# Patient Record
Sex: FEMALE | Race: BLACK OR AFRICAN AMERICAN | NOT HISPANIC OR LATINO | Employment: FULL TIME | URBAN - METROPOLITAN AREA
[De-identification: names, ages, dates, MRNs, and addresses within clinical notes are randomized per-mention and may not be internally consistent; named-entity substitution may affect disease eponyms.]

---

## 2017-02-17 ENCOUNTER — HOSPITAL ENCOUNTER (EMERGENCY)
Facility: HOSPITAL | Age: 32
Discharge: HOME | End: 2017-02-17
Attending: EMERGENCY MEDICINE | Admitting: EMERGENCY MEDICINE

## 2017-02-17 ENCOUNTER — APPOINTMENT (OUTPATIENT)
Dept: GENERAL RADIOLOGY | Facility: HOSPITAL | Age: 32
End: 2017-02-17

## 2017-02-17 VITALS
RESPIRATION RATE: 16 BRPM | BODY MASS INDEX: 43.39 KG/M2 | DIASTOLIC BLOOD PRESSURE: 81 MMHG | SYSTOLIC BLOOD PRESSURE: 126 MMHG | TEMPERATURE: 98.2 F | HEART RATE: 86 BPM | WEIGHT: 270 LBS | HEIGHT: 66 IN | OXYGEN SATURATION: 94 %

## 2017-02-17 DIAGNOSIS — M54.2 MUSCLE PAIN, CERVICAL: ICD-10-CM

## 2017-02-17 DIAGNOSIS — R07.9 CHEST PAIN, UNSPECIFIED TYPE: Primary | ICD-10-CM

## 2017-02-17 DIAGNOSIS — M54.2 NECK PAIN ON RIGHT SIDE: ICD-10-CM

## 2017-02-17 DIAGNOSIS — E87.6 HYPOKALEMIA: ICD-10-CM

## 2017-02-17 LAB
ALBUMIN SERPL-MCNC: 4.1 G/DL (ref 3.2–4.8)
ALBUMIN/GLOB SERPL: 1.5 G/DL (ref 1.5–2.5)
ALP SERPL-CCNC: 49 U/L (ref 25–100)
ALT SERPL W P-5'-P-CCNC: 22 U/L (ref 7–40)
ANION GAP SERPL CALCULATED.3IONS-SCNC: 7 MMOL/L (ref 3–11)
AST SERPL-CCNC: 24 U/L (ref 0–33)
BASOPHILS # BLD AUTO: 0.01 10*3/MM3 (ref 0–0.2)
BASOPHILS NFR BLD AUTO: 0.2 % (ref 0–1)
BILIRUB SERPL-MCNC: 0.2 MG/DL (ref 0.3–1.2)
BUN BLD-MCNC: 9 MG/DL (ref 9–23)
BUN/CREAT SERPL: 12.9 (ref 7–25)
CALCIUM SPEC-SCNC: 9.3 MG/DL (ref 8.7–10.4)
CHLORIDE SERPL-SCNC: 108 MMOL/L (ref 99–109)
CO2 SERPL-SCNC: 25 MMOL/L (ref 20–31)
CREAT BLD-MCNC: 0.7 MG/DL (ref 0.6–1.3)
D DIMER PPP FEU-MCNC: 0.25 MG/L (FEU) (ref 0–0.5)
DEPRECATED RDW RBC AUTO: 43.7 FL (ref 37–54)
EOSINOPHIL # BLD AUTO: 0.14 10*3/MM3 (ref 0.1–0.3)
EOSINOPHIL NFR BLD AUTO: 2.3 % (ref 0–3)
ERYTHROCYTE [DISTWIDTH] IN BLOOD BY AUTOMATED COUNT: 13.2 % (ref 11.3–14.5)
GFR SERPL CREATININE-BSD FRML MDRD: 117 ML/MIN/1.73
GLOBULIN UR ELPH-MCNC: 2.8 GM/DL
GLUCOSE BLD-MCNC: 125 MG/DL (ref 70–100)
HCT VFR BLD AUTO: 42.1 % (ref 34.5–44)
HGB BLD-MCNC: 13.9 G/DL (ref 11.5–15.5)
HOLD SPECIMEN: NORMAL
HOLD SPECIMEN: NORMAL
IMM GRANULOCYTES # BLD: 0.01 10*3/MM3 (ref 0–0.03)
IMM GRANULOCYTES NFR BLD: 0.2 % (ref 0–0.6)
INR PPP: 1.03
LYMPHOCYTES # BLD AUTO: 1.8 10*3/MM3 (ref 0.6–4.8)
LYMPHOCYTES NFR BLD AUTO: 29.2 % (ref 24–44)
MCH RBC QN AUTO: 30 PG (ref 27–31)
MCHC RBC AUTO-ENTMCNC: 33 G/DL (ref 32–36)
MCV RBC AUTO: 90.9 FL (ref 80–99)
MONOCYTES # BLD AUTO: 0.44 10*3/MM3 (ref 0–1)
MONOCYTES NFR BLD AUTO: 7.1 % (ref 0–12)
NEUTROPHILS # BLD AUTO: 3.77 10*3/MM3 (ref 1.5–8.3)
NEUTROPHILS NFR BLD AUTO: 61 % (ref 41–71)
PLATELET # BLD AUTO: 200 10*3/MM3 (ref 150–450)
PMV BLD AUTO: 10.6 FL (ref 6–12)
POTASSIUM BLD-SCNC: 3.4 MMOL/L (ref 3.5–5.5)
PROT SERPL-MCNC: 6.9 G/DL (ref 5.7–8.2)
PROTHROMBIN TIME: 11.2 SECONDS (ref 9.6–11.5)
RBC # BLD AUTO: 4.63 10*6/MM3 (ref 3.89–5.14)
SODIUM BLD-SCNC: 140 MMOL/L (ref 132–146)
TROPONIN I SERPL-MCNC: 0 NG/ML (ref 0–0.07)
WBC NRBC COR # BLD: 6.17 10*3/MM3 (ref 3.5–10.8)
WHOLE BLOOD HOLD SPECIMEN: NORMAL
WHOLE BLOOD HOLD SPECIMEN: NORMAL

## 2017-02-17 PROCEDURE — 85610 PROTHROMBIN TIME: CPT | Performed by: EMERGENCY MEDICINE

## 2017-02-17 PROCEDURE — 99284 EMERGENCY DEPT VISIT MOD MDM: CPT

## 2017-02-17 PROCEDURE — 85379 FIBRIN DEGRADATION QUANT: CPT | Performed by: EMERGENCY MEDICINE

## 2017-02-17 PROCEDURE — 96374 THER/PROPH/DIAG INJ IV PUSH: CPT

## 2017-02-17 PROCEDURE — 85025 COMPLETE CBC W/AUTO DIFF WBC: CPT | Performed by: EMERGENCY MEDICINE

## 2017-02-17 PROCEDURE — 71010 HC CHEST PA OR AP: CPT

## 2017-02-17 PROCEDURE — 93005 ELECTROCARDIOGRAM TRACING: CPT | Performed by: EMERGENCY MEDICINE

## 2017-02-17 PROCEDURE — 80053 COMPREHEN METABOLIC PANEL: CPT | Performed by: EMERGENCY MEDICINE

## 2017-02-17 PROCEDURE — 25010000002 LORAZEPAM PER 2 MG: Performed by: EMERGENCY MEDICINE

## 2017-02-17 PROCEDURE — 84484 ASSAY OF TROPONIN QUANT: CPT

## 2017-02-17 RX ORDER — SODIUM CHLORIDE 0.9 % (FLUSH) 0.9 %
10 SYRINGE (ML) INJECTION AS NEEDED
Status: DISCONTINUED | OUTPATIENT
Start: 2017-02-17 | End: 2017-02-17 | Stop reason: HOSPADM

## 2017-02-17 RX ORDER — ASPIRIN 81 MG/1
324 TABLET, CHEWABLE ORAL ONCE
Status: COMPLETED | OUTPATIENT
Start: 2017-02-17 | End: 2017-02-17

## 2017-02-17 RX ORDER — CYCLOBENZAPRINE HCL 10 MG
10 TABLET ORAL 3 TIMES DAILY PRN
Qty: 6 TABLET | Refills: 0 | OUTPATIENT
Start: 2017-02-17 | End: 2020-01-04 | Stop reason: HOSPADM

## 2017-02-17 RX ORDER — LORAZEPAM 2 MG/ML
0.5 INJECTION INTRAMUSCULAR ONCE
Status: COMPLETED | OUTPATIENT
Start: 2017-02-17 | End: 2017-02-17

## 2017-02-17 RX ADMIN — LORAZEPAM 0.5 MG: 2 INJECTION INTRAMUSCULAR; INTRAVENOUS at 02:12

## 2017-02-17 RX ADMIN — ASPIRIN 81 MG 324 MG: 81 TABLET ORAL at 02:09

## 2017-02-17 NOTE — DISCHARGE INSTRUCTIONS
Follow up at the chest pain clinic as discussed.    Please review the medications you are supposed to be taking according to prior physician recommendations. I have not changed your home medications during this visit. If your discharge instructions indicate that I have changed your home medications, this is not the case, and you should disregard. If you have any questions about the medication you should be taking at home, please call your physician.

## 2017-02-17 NOTE — ED PROVIDER NOTES
Subjective   HPI Comments: Patient is a healthy 31-year-old female who presents today with chest pain starting 4 hours ago.  She states that the pain is distributed throughout the chest and also to the bilateral shoulders with the right hurting more than the left.  The pain is reproducible with movement of the arms.    Patient is a 31 y.o. female presenting with chest pain.   History provided by:  Patient  Chest Pain   Pain location:  Substernal area  Pain quality: dull    Pain quality comment:  Ocasional sharp pain  Radiates to: From right shoulder to the chest.  Also has less significant left shoulder pain.  Pain severity:  Moderate  Onset quality:  Gradual  Duration:  4 hours  Timing:  Constant  Progression:  Waxing and waning  Chronicity:  New  Context: movement, raising an arm, at rest and stress    Context: not breathing, not drug use, not eating, not intercourse, not lifting and not trauma    Relieved by:  Nothing  Worsened by:  Nothing  Ineffective treatments:  None tried  Associated symptoms: anxiety and fatigue    Associated symptoms: no abdominal pain, no altered mental status, no back pain, no cough, no diaphoresis, no dizziness, no fever, no headache, no nausea, no near-syncope, no numbness, no syncope and no vomiting    Risk factors: obesity and smoking    Risk factors: no aortic disease, no birth control, no coronary artery disease, no diabetes mellitus, no Arpita-Danlos syndrome, no high cholesterol, no hypertension, no immobilization, not male, no Marfan's syndrome, not pregnant, no prior DVT/PE and no surgery        Review of Systems   Constitutional: Positive for fatigue. Negative for diaphoresis and fever.   Respiratory: Negative for cough.    Cardiovascular: Positive for chest pain. Negative for syncope and near-syncope.   Gastrointestinal: Negative for abdominal pain, nausea and vomiting.   Genitourinary:        Pt is currently on her period and has expected normal cramping.   Musculoskeletal:  Negative for back pain.   Neurological: Negative for dizziness, numbness and headaches.   All other systems reviewed and are negative.      Past Medical History   Diagnosis Date   • GERD (gastroesophageal reflux disease)        No Known Allergies    Past Surgical History   Procedure Laterality Date   • Breast surgery     • Cholecystectomy     • Gastric sleeve laparoscopic         History reviewed. No pertinent family history.    Social History     Social History   • Marital status:      Spouse name: N/A   • Number of children: N/A   • Years of education: N/A     Social History Main Topics   • Smoking status: Light Tobacco Smoker   • Smokeless tobacco: None   • Alcohol use No   • Drug use: No   • Sexual activity: Not Asked     Other Topics Concern   • None     Social History Narrative           Objective   Physical Exam   Constitutional: She is oriented to person, place, and time. She appears well-developed and well-nourished. No distress.   HENT:   Head: Normocephalic and atraumatic.   Eyes: Conjunctivae and EOM are normal. Pupils are equal, round, and reactive to light.   Neck: Normal range of motion. Neck supple. No thyromegaly present.   Cardiovascular: Normal rate, regular rhythm and normal heart sounds.  Exam reveals no gallop and no friction rub.    No murmur heard.  Pulmonary/Chest: Effort normal and breath sounds normal. No respiratory distress. She exhibits no tenderness.   Abdominal: Soft. There is no tenderness.   Musculoskeletal: Normal range of motion. She exhibits no edema.   Lymphadenopathy:     She has no cervical adenopathy.   Neurological: She is alert and oriented to person, place, and time.   Skin: Skin is warm and dry.   Psychiatric: She has a normal mood and affect.   Nursing note and vitals reviewed.      Procedures         ED Course  ED Course      Recent Results (from the past 24 hour(s))   Comprehensive Metabolic Panel    Collection Time: 02/17/17  2:03 AM   Result Value Ref Range     Glucose 125 (H) 70 - 100 mg/dL    BUN 9 9 - 23 mg/dL    Creatinine 0.70 0.60 - 1.30 mg/dL    Sodium 140 132 - 146 mmol/L    Potassium 3.4 (L) 3.5 - 5.5 mmol/L    Chloride 108 99 - 109 mmol/L    CO2 25.0 20.0 - 31.0 mmol/L    Calcium 9.3 8.7 - 10.4 mg/dL    Total Protein 6.9 5.7 - 8.2 g/dL    Albumin 4.10 3.20 - 4.80 g/dL    ALT (SGPT) 22 7 - 40 U/L    AST (SGOT) 24 0 - 33 U/L    Alkaline Phosphatase 49 25 - 100 U/L    Total Bilirubin 0.2 (L) 0.3 - 1.2 mg/dL    eGFR  African Amer 117 >60 mL/min/1.73    Globulin 2.8 gm/dL    A/G Ratio 1.5 1.5 - 2.5 g/dL    BUN/Creatinine Ratio 12.9 7.0 - 25.0    Anion Gap 7.0 3.0 - 11.0 mmol/L   Protime-INR    Collection Time: 02/17/17  2:03 AM   Result Value Ref Range    Protime 11.2 9.6 - 11.5 Seconds    INR 1.03    CBC Auto Differential    Collection Time: 02/17/17  2:03 AM   Result Value Ref Range    WBC 6.17 3.50 - 10.80 10*3/mm3    RBC 4.63 3.89 - 5.14 10*6/mm3    Hemoglobin 13.9 11.5 - 15.5 g/dL    Hematocrit 42.1 34.5 - 44.0 %    MCV 90.9 80.0 - 99.0 fL    MCH 30.0 27.0 - 31.0 pg    MCHC 33.0 32.0 - 36.0 g/dL    RDW 13.2 11.3 - 14.5 %    RDW-SD 43.7 37.0 - 54.0 fl    MPV 10.6 6.0 - 12.0 fL    Platelets 200 150 - 450 10*3/mm3    Neutrophil % 61.0 41.0 - 71.0 %    Lymphocyte % 29.2 24.0 - 44.0 %    Monocyte % 7.1 0.0 - 12.0 %    Eosinophil % 2.3 0.0 - 3.0 %    Basophil % 0.2 0.0 - 1.0 %    Immature Grans % 0.2 0.0 - 0.6 %    Neutrophils, Absolute 3.77 1.50 - 8.30 10*3/mm3    Lymphocytes, Absolute 1.80 0.60 - 4.80 10*3/mm3    Monocytes, Absolute 0.44 0.00 - 1.00 10*3/mm3    Eosinophils, Absolute 0.14 0.10 - 0.30 10*3/mm3    Basophils, Absolute 0.01 0.00 - 0.20 10*3/mm3    Immature Grans, Absolute 0.01 0.00 - 0.03 10*3/mm3   D-dimer, Quantitative    Collection Time: 02/17/17  2:03 AM   Result Value Ref Range    D-Dimer, Quantitative 0.25 0.00 - 0.50 mg/L (FEU)   POC Troponin, Rapid    Collection Time: 02/17/17  2:05 AM   Result Value Ref Range    Troponin I 0.00 0.00 - 0.07 ng/mL  "    Note: In addition to lab results from this visit, the labs listed above may include labs taken at another facility or during a different encounter within the last 24 hours. Please correlate lab times with ED admission and discharge times for further clarification of the services performed during this visit.    XR Chest 1 View    (Results Pending)     Vitals:    02/17/17 0120 02/17/17 0213 02/17/17 0259 02/17/17 0406   BP: 144/89 145/90 141/91 126/81   Pulse: 96 84 82 86   Resp: 14   16   Temp: 98.2 °F (36.8 °C)      SpO2: 95% 99% 92% 94%   Weight: 270 lb (122 kg)      Height: 66\" (167.6 cm)        Medications   sodium chloride 0.9 % flush 10 mL (not administered)   aspirin chewable tablet 324 mg (324 mg Oral Given 2/17/17 0209)   LORazepam (ATIVAN) injection 0.5 mg (0.5 mg Intravenous Given 2/17/17 0212)     ECG/EMG Results (last 24 hours)     Procedure Component Value Units Date/Time    ECG 12 Lead [[id number]] Collected:  02/17/17 0127     Updated:  02/17/17 0310    Narrative:       Test Reason : cp  Blood Pressure : **/** mmHG  Vent. Rate : 089 BPM     Atrial Rate : 089 BPM     P-R Int : 174 ms          QRS Dur : 108 ms      QT Int : 392 ms       P-R-T Axes : 062 -06 003 degrees     QTc Int : 476 ms    Normal sinus rhythm  When compared with ECG of 19-FEB-2010 02:33,  fusion complexes are no longer present  Confirmed by NATALIIA RICHARDSON ([id number]) on 2/17/2017 3:09:27 AM  Also confirmed by MD RM CORY ([id number])  on 2/17/2017 3:10:01 AM    Referred By: STAT ERMD           Confirmed By:SKYLA RM MD        Patient felt significantly better primarily following the administration of Ativan.  Discussed multiple etiologies for chest pain along with patient's imaging and lab studies.  She will follow the chest pain clinic later today and will return to the emergency department should symptoms return or other concerns arise.            MDM    Final diagnoses:   Chest pain, unspecified type   Neck pain on right side "   Muscle pain, cervical   Hypokalemia            Ravin Ochoa,   02/17/17 9893

## 2017-04-04 ENCOUNTER — APPOINTMENT (OUTPATIENT)
Dept: CT IMAGING | Facility: HOSPITAL | Age: 32
End: 2017-04-04

## 2017-04-04 ENCOUNTER — HOSPITAL ENCOUNTER (EMERGENCY)
Facility: HOSPITAL | Age: 32
Discharge: HOME | End: 2017-04-04
Attending: EMERGENCY MEDICINE | Admitting: EMERGENCY MEDICINE

## 2017-04-04 VITALS
BODY MASS INDEX: 40.81 KG/M2 | DIASTOLIC BLOOD PRESSURE: 62 MMHG | HEIGHT: 67 IN | RESPIRATION RATE: 14 BRPM | TEMPERATURE: 98.2 F | SYSTOLIC BLOOD PRESSURE: 111 MMHG | HEART RATE: 64 BPM | WEIGHT: 260 LBS | OXYGEN SATURATION: 100 %

## 2017-04-04 DIAGNOSIS — R10.9 ABDOMINAL PAIN, UNSPECIFIED LOCATION: Primary | ICD-10-CM

## 2017-04-04 DIAGNOSIS — R11.10 VOMITING AND DIARRHEA: ICD-10-CM

## 2017-04-04 DIAGNOSIS — R19.7 VOMITING AND DIARRHEA: ICD-10-CM

## 2017-04-04 LAB
ALBUMIN SERPL-MCNC: 4.2 G/DL (ref 3.2–4.8)
ALBUMIN/GLOB SERPL: 1.4 G/DL (ref 1.5–2.5)
ALP SERPL-CCNC: 57 U/L (ref 25–100)
ALT SERPL W P-5'-P-CCNC: 20 U/L (ref 7–40)
ANION GAP SERPL CALCULATED.3IONS-SCNC: 8 MMOL/L (ref 3–11)
AST SERPL-CCNC: 22 U/L (ref 0–33)
B-HCG UR QL: NEGATIVE
BACTERIA UR QL AUTO: ABNORMAL /HPF
BASOPHILS # BLD AUTO: 0 10*3/MM3 (ref 0–0.2)
BASOPHILS NFR BLD AUTO: 0 % (ref 0–1)
BILIRUB SERPL-MCNC: 0.4 MG/DL (ref 0.3–1.2)
BILIRUB UR QL STRIP: NEGATIVE
BUN BLD-MCNC: 7 MG/DL (ref 9–23)
BUN/CREAT SERPL: 10 (ref 7–25)
CALCIUM SPEC-SCNC: 9.2 MG/DL (ref 8.7–10.4)
CHLORIDE SERPL-SCNC: 105 MMOL/L (ref 99–109)
CLARITY UR: ABNORMAL
CO2 SERPL-SCNC: 26 MMOL/L (ref 20–31)
COLOR UR: YELLOW
CREAT BLD-MCNC: 0.7 MG/DL (ref 0.6–1.3)
DEPRECATED RDW RBC AUTO: 43.4 FL (ref 37–54)
EOSINOPHIL # BLD AUTO: 0.09 10*3/MM3 (ref 0.1–0.3)
EOSINOPHIL NFR BLD AUTO: 1.5 % (ref 0–3)
ERYTHROCYTE [DISTWIDTH] IN BLOOD BY AUTOMATED COUNT: 13.2 % (ref 11.3–14.5)
GFR SERPL CREATININE-BSD FRML MDRD: 116 ML/MIN/1.73
GLOBULIN UR ELPH-MCNC: 3 GM/DL
GLUCOSE BLD-MCNC: 108 MG/DL (ref 70–100)
GLUCOSE UR STRIP-MCNC: NEGATIVE MG/DL
HCT VFR BLD AUTO: 43.3 % (ref 34.5–44)
HGB BLD-MCNC: 14.7 G/DL (ref 11.5–15.5)
HGB UR QL STRIP.AUTO: NEGATIVE
HOLD SPECIMEN: NORMAL
HYALINE CASTS UR QL AUTO: ABNORMAL /LPF
IMM GRANULOCYTES # BLD: 0.02 10*3/MM3 (ref 0–0.03)
IMM GRANULOCYTES NFR BLD: 0.3 % (ref 0–0.6)
INTERNAL NEGATIVE CONTROL: NORMAL
INTERNAL POSITIVE CONTROL: NORMAL
KETONES UR QL STRIP: NEGATIVE
LEUKOCYTE ESTERASE UR QL STRIP.AUTO: NEGATIVE
LIPASE SERPL-CCNC: 37 U/L (ref 6–51)
LYMPHOCYTES # BLD AUTO: 0.92 10*3/MM3 (ref 0.6–4.8)
LYMPHOCYTES NFR BLD AUTO: 15.6 % (ref 24–44)
Lab: NORMAL
MCH RBC QN AUTO: 30.8 PG (ref 27–31)
MCHC RBC AUTO-ENTMCNC: 33.9 G/DL (ref 32–36)
MCV RBC AUTO: 90.8 FL (ref 80–99)
MONOCYTES # BLD AUTO: 0.57 10*3/MM3 (ref 0–1)
MONOCYTES NFR BLD AUTO: 9.6 % (ref 0–12)
NEUTROPHILS # BLD AUTO: 4.31 10*3/MM3 (ref 1.5–8.3)
NEUTROPHILS NFR BLD AUTO: 73 % (ref 41–71)
NITRITE UR QL STRIP: NEGATIVE
PH UR STRIP.AUTO: 6.5 [PH] (ref 5–8)
PLATELET # BLD AUTO: 195 10*3/MM3 (ref 150–450)
PMV BLD AUTO: 10.6 FL (ref 6–12)
POTASSIUM BLD-SCNC: 3.4 MMOL/L (ref 3.5–5.5)
PROT SERPL-MCNC: 7.2 G/DL (ref 5.7–8.2)
PROT UR QL STRIP: NEGATIVE
RBC # BLD AUTO: 4.77 10*6/MM3 (ref 3.89–5.14)
RBC # UR: ABNORMAL /HPF
REF LAB TEST METHOD: ABNORMAL
SODIUM BLD-SCNC: 139 MMOL/L (ref 132–146)
SP GR UR STRIP: 1.02 (ref 1–1.03)
SQUAMOUS #/AREA URNS HPF: ABNORMAL /HPF
UROBILINOGEN UR QL STRIP: ABNORMAL
WBC NRBC COR # BLD: 5.91 10*3/MM3 (ref 3.5–10.8)
WBC UR QL AUTO: ABNORMAL /HPF
WHOLE BLOOD HOLD SPECIMEN: NORMAL

## 2017-04-04 PROCEDURE — 85025 COMPLETE CBC W/AUTO DIFF WBC: CPT | Performed by: EMERGENCY MEDICINE

## 2017-04-04 PROCEDURE — 99284 EMERGENCY DEPT VISIT MOD MDM: CPT

## 2017-04-04 PROCEDURE — 96375 TX/PRO/DX INJ NEW DRUG ADDON: CPT

## 2017-04-04 PROCEDURE — 25010000002 HYDROMORPHONE PER 4 MG: Performed by: EMERGENCY MEDICINE

## 2017-04-04 PROCEDURE — 25010000002 ONDANSETRON PER 1 MG: Performed by: EMERGENCY MEDICINE

## 2017-04-04 PROCEDURE — 0 DIATRIZOATE MEGLUMINE & SODIUM PER 1 ML: Performed by: EMERGENCY MEDICINE

## 2017-04-04 PROCEDURE — 96361 HYDRATE IV INFUSION ADD-ON: CPT

## 2017-04-04 PROCEDURE — 96374 THER/PROPH/DIAG INJ IV PUSH: CPT

## 2017-04-04 PROCEDURE — 83690 ASSAY OF LIPASE: CPT | Performed by: EMERGENCY MEDICINE

## 2017-04-04 PROCEDURE — 0 IOPAMIDOL 61 % SOLUTION: Performed by: EMERGENCY MEDICINE

## 2017-04-04 PROCEDURE — 74177 CT ABD & PELVIS W/CONTRAST: CPT

## 2017-04-04 PROCEDURE — 25010000002 PROMETHAZINE PER 50 MG: Performed by: NURSE PRACTITIONER

## 2017-04-04 PROCEDURE — 96376 TX/PRO/DX INJ SAME DRUG ADON: CPT

## 2017-04-04 PROCEDURE — 81001 URINALYSIS AUTO W/SCOPE: CPT | Performed by: EMERGENCY MEDICINE

## 2017-04-04 PROCEDURE — 80053 COMPREHEN METABOLIC PANEL: CPT | Performed by: EMERGENCY MEDICINE

## 2017-04-04 RX ORDER — PROMETHAZINE HYDROCHLORIDE 25 MG/ML
12.5 INJECTION, SOLUTION INTRAMUSCULAR; INTRAVENOUS ONCE
Status: COMPLETED | OUTPATIENT
Start: 2017-04-04 | End: 2017-04-04

## 2017-04-04 RX ORDER — HYDROMORPHONE HYDROCHLORIDE 1 MG/ML
0.5 INJECTION, SOLUTION INTRAMUSCULAR; INTRAVENOUS; SUBCUTANEOUS ONCE
Status: COMPLETED | OUTPATIENT
Start: 2017-04-04 | End: 2017-04-04

## 2017-04-04 RX ORDER — HYDROCODONE BITARTRATE AND ACETAMINOPHEN 5; 325 MG/1; MG/1
1-2 TABLET ORAL EVERY 6 HOURS PRN
Qty: 12 TABLET | Refills: 0 | OUTPATIENT
Start: 2017-04-04 | End: 2020-01-04 | Stop reason: HOSPADM

## 2017-04-04 RX ORDER — SODIUM CHLORIDE 0.9 % (FLUSH) 0.9 %
10 SYRINGE (ML) INJECTION AS NEEDED
Status: DISCONTINUED | OUTPATIENT
Start: 2017-04-04 | End: 2017-04-04 | Stop reason: HOSPADM

## 2017-04-04 RX ORDER — ONDANSETRON 2 MG/ML
4 INJECTION INTRAMUSCULAR; INTRAVENOUS ONCE
Status: COMPLETED | OUTPATIENT
Start: 2017-04-04 | End: 2017-04-04

## 2017-04-04 RX ORDER — DICYCLOMINE HCL 20 MG
20 TABLET ORAL EVERY 6 HOURS
Qty: 20 TABLET | Refills: 0 | OUTPATIENT
Start: 2017-04-04 | End: 2020-01-04 | Stop reason: HOSPADM

## 2017-04-04 RX ORDER — PROMETHAZINE HYDROCHLORIDE 25 MG/1
25 TABLET ORAL EVERY 6 HOURS PRN
Qty: 12 TABLET | Refills: 0 | OUTPATIENT
Start: 2017-04-04 | End: 2020-01-04 | Stop reason: HOSPADM

## 2017-04-04 RX ADMIN — IOPAMIDOL 95 ML: 612 INJECTION, SOLUTION INTRAVENOUS at 16:08

## 2017-04-04 RX ADMIN — HYDROMORPHONE HYDROCHLORIDE 0.5 MG: 1 INJECTION, SOLUTION INTRAMUSCULAR; INTRAVENOUS; SUBCUTANEOUS at 14:15

## 2017-04-04 RX ADMIN — HYDROMORPHONE HYDROCHLORIDE 0.5 MG: 1 INJECTION, SOLUTION INTRAMUSCULAR; INTRAVENOUS; SUBCUTANEOUS at 16:35

## 2017-04-04 RX ADMIN — DIATRIZOATE MEGLUMINE AND DIATRIZOATE SODIUM 15 ML: 660; 100 LIQUID ORAL; RECTAL at 14:20

## 2017-04-04 RX ADMIN — PROMETHAZINE HYDROCHLORIDE 12.5 MG: 25 INJECTION, SOLUTION INTRAMUSCULAR; INTRAVENOUS at 17:22

## 2017-04-04 RX ADMIN — SODIUM CHLORIDE 1000 ML: 9 INJECTION, SOLUTION INTRAVENOUS at 14:14

## 2017-04-04 RX ADMIN — ONDANSETRON 4 MG: 2 INJECTION INTRAMUSCULAR; INTRAVENOUS at 16:36

## 2017-04-04 RX ADMIN — ONDANSETRON 4 MG: 2 INJECTION INTRAMUSCULAR; INTRAVENOUS at 14:15

## 2017-04-04 NOTE — DISCHARGE INSTRUCTIONS
Follow up with one of the [facility] physician groups below to setup primary care. If you have trouble following up, please call Gracie Hess, our transitional care nurse, at [phone].    (Dr. Foy, Dr. Walker, Dr. Beckwith, and Dr. Allison.)  [facility], Primary Care, [phone], [address], [place] [address]    [facility], Primary Care, [phone], [address] [place], [address] [facility]) [facility] Medical [facility], Primary Care, [phone], [address] [place], [address] [facility], Primary Care, [phone], [address], [address] [place], [address]     [place] [address] [facility] Medical [facility], Primary Care, [phone], [address], [address] [place], [address]    [place] [address] [facility], Primary Care, [phone], [address]    [place], [address] [facility], Primary Care, [phone], [address] [place], [address] [facility] Medical [facility], Primary Care, [phone], [address], [address] [place], [address] [facility]) [facility] Medical [facility], Primary Care, [phone], [address] [place], [address] [facility] Medical [facility], Primary Care, [phone], [address], [address] [place], [address] [facility], Primary Care, [phone], [address], [place], [address]     [facility], Primary Care, [phone], [address] [address]    Follow up with one of the physician centers below to setup primary care.    [facility], [phone],  [address], [address], [place], [facility]    [facility], [phone], [address] [facility] [address], [address] [place] [address], [phone], [address] [place], [facility];     [facility], [phone], [address] [place] [address]

## 2017-04-04 NOTE — ED NOTES
Pt complained of extreme pain upon insertion of butterfly needle (with flash) and insisted that she would wait for the IV for blood draw.     Arsalan Chu  04/04/17 4512

## 2017-04-04 NOTE — ED PROVIDER NOTES
Subjective   Patient is a 32 y.o. female presenting with abdominal pain.   Abdominal Pain   Pain location:  Generalized  Pain quality: aching and cramping    Pain radiates to:  Does not radiate  Onset quality:  Gradual  Duration:  2 days  Timing:  Constant  Progression:  Waxing and waning  Chronicity:  New  Context: eating    Relieved by:  Nothing  Worsened by:  Eating  Associated symptoms: diarrhea, nausea and vomiting    Associated symptoms: no fever        Review of Systems   Constitutional: Negative for fever.   Gastrointestinal: Positive for abdominal pain, diarrhea, nausea and vomiting.   All other systems reviewed and are negative.      Past Medical History:   Diagnosis Date   • GERD (gastroesophageal reflux disease)        No Known Allergies    Past Surgical History:   Procedure Laterality Date   • BREAST SURGERY     • CHOLECYSTECTOMY     • GASTRIC SLEEVE LAPAROSCOPIC         History reviewed. No pertinent family history.    Social History     Social History   • Marital status:      Spouse name: N/A   • Number of children: N/A   • Years of education: N/A     Social History Main Topics   • Smoking status: Light Tobacco Smoker   • Smokeless tobacco: None   • Alcohol use No   • Drug use: No   • Sexual activity: Not Asked     Other Topics Concern   • None     Social History Narrative           Objective   Physical Exam   Constitutional: She is oriented to person, place, and time. She appears well-developed and well-nourished.   HENT:   Head: Normocephalic and atraumatic.   Right Ear: External ear normal.   Left Ear: External ear normal.   Nose: Nose normal.   Mouth/Throat: Oropharynx is clear and moist.   Eyes: Conjunctivae and EOM are normal. Pupils are equal, round, and reactive to light.   Neck: Normal range of motion. Neck supple.   Cardiovascular: Normal rate, regular rhythm, normal heart sounds and intact distal pulses.    Pulmonary/Chest: Effort normal and breath sounds normal.   Abdominal: Soft.  Bowel sounds are normal. There is tenderness.   Musculoskeletal: Normal range of motion.   Neurological: She is alert and oriented to person, place, and time.   Skin: Skin is warm and dry.   Psychiatric: She has a normal mood and affect. Her behavior is normal. Judgment and thought content normal.       Procedures         ED Course  ED Course   Comment By Time   Well aware of the ss of worsening condition. All thankful and agreeable. Pcp fu this week. TUYET Garrison 04/04 1641   Seen by bariatric surgeon in [facility]. Told her sleeve was ok. Will give colorectal fu as she reports they wanted her to see them next. TUYET Garrison 04/04 1704                  [facility]    Final diagnoses:   Abdominal pain, unspecified location   Vomiting and diarrhea            TUYET Garrison  04/04/17 1652       TUYET Garrison  04/04/17 1705       TUYET Garrison  04/04/17 1822

## 2018-03-14 ENCOUNTER — APPOINTMENT (OUTPATIENT)
Dept: GENERAL RADIOLOGY | Facility: HOSPITAL | Age: 33
End: 2018-03-14

## 2018-03-14 ENCOUNTER — HOSPITAL ENCOUNTER (EMERGENCY)
Facility: HOSPITAL | Age: 33
Discharge: HOME | End: 2018-03-14
Attending: EMERGENCY MEDICINE | Admitting: EMERGENCY MEDICINE

## 2018-03-14 VITALS
BODY MASS INDEX: 42.38 KG/M2 | RESPIRATION RATE: 16 BRPM | HEART RATE: 62 BPM | TEMPERATURE: 98.2 F | SYSTOLIC BLOOD PRESSURE: 140 MMHG | HEIGHT: 67 IN | OXYGEN SATURATION: 98 % | DIASTOLIC BLOOD PRESSURE: 80 MMHG | WEIGHT: 270 LBS

## 2018-03-14 DIAGNOSIS — S16.1XXA STRAIN OF NECK MUSCLE, INITIAL ENCOUNTER: ICD-10-CM

## 2018-03-14 DIAGNOSIS — S39.012A STRAIN OF LUMBAR REGION, INITIAL ENCOUNTER: ICD-10-CM

## 2018-03-14 DIAGNOSIS — S70.02XA CONTUSION OF LEFT HIP, INITIAL ENCOUNTER: ICD-10-CM
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
## 2018-03-14 DIAGNOSIS — S40.012A CONTUSION OF LEFT SHOULDER, INITIAL ENCOUNTER: ICD-10-CM

## 2018-03-14 DIAGNOSIS — S09.90XA INJURY OF HEAD, INITIAL ENCOUNTER: ICD-10-CM

## 2018-03-14 LAB
B-HCG UR QL: NEGATIVE
INTERNAL NEGATIVE CONTROL: NEGATIVE
INTERNAL POSITIVE CONTROL: POSITIVE
Lab: NORMAL

## 2018-03-14 PROCEDURE — 73030 X-RAY EXAM OF SHOULDER: CPT

## 2018-03-14 PROCEDURE — 73502 X-RAY EXAM HIP UNI 2-3 VIEWS: CPT

## 2018-03-14 PROCEDURE — 99283 EMERGENCY DEPT VISIT LOW MDM: CPT

## 2018-03-14 PROCEDURE — 72100 X-RAY EXAM L-S SPINE 2/3 VWS: CPT

## 2018-03-14 PROCEDURE — 72040 X-RAY EXAM NECK SPINE 2-3 VW: CPT

## 2018-03-14 RX ORDER — ACETAMINOPHEN 500 MG
1000 TABLET ORAL ONCE
Status: COMPLETED | OUTPATIENT
Start: 2018-03-14 | End: 2018-03-14

## 2018-03-14 RX ADMIN — ACETAMINOPHEN 1000 MG: 500 TABLET ORAL at 13:38

## 2018-03-14 NOTE — ED PROVIDER NOTES
Subjective   Pt is a 32 yo female presenting to ED after a fall. She was walking outside after a client visit and fell on ice. She landed on her left side. She is having left neck, shoulder, lower back and left hip pain. She is unsure if she hit her head but denies LOC. She does have a mild headache but denies worst HA of life or nausea. She had dizziness earlier but denies currently dizziness. She denies numbness, tingling or weakness to UE or LE. She has taken nothing for pain PTA. She is able to walk and move without significant difficulty. She denies abrasion or laceration. She is not on blood thinners.         History provided by:  Patient  Fall   Mechanism of injury: fall    Injury location:  Head/neck, torso and shoulder/arm  Shoulder/arm injury location:  L shoulder  Torso injury location:  Back (Left neck and left lower back )  Incident location:  Work  Time since incident: PTA.  Fall:     Fall occurred: Slipped on ice leaving a clients house     Impact surface:  [place]    Point of impact: Left side of body   Suspicion of alcohol use: no    Suspicion of drug use: no    Associated symptoms: back pain, headaches and neck pain    Associated symptoms: no abdominal pain, no chest pain, no difficulty breathing, no nausea and no vomiting    Risk factors: no anticoagulation therapy        Review of Systems   HENT: Negative for facial swelling and nosebleeds.    Eyes: Negative for visual disturbance.   Respiratory: Negative for shortness of breath.    Cardiovascular: Negative for chest pain.   Gastrointestinal: Negative for abdominal pain, nausea and vomiting.   Genitourinary: Negative for hematuria.   Musculoskeletal: Positive for back pain and neck pain.   Skin: Negative for wound.   Neurological: Positive for dizziness and headaches. Negative for weakness and light-headedness.   Psychiatric/Behavioral: Negative for confusion.   All other systems reviewed and are negative.      Past Medical History:   Diagnosis  Date   • Anxiety    • Depression    • GERD (gastroesophageal reflux disease)        No Known Allergies    Past Surgical History:   Procedure Laterality Date   • BREAST SURGERY     • CHOLECYSTECTOMY     • GASTRIC SLEEVE LAPAROSCOPIC         History reviewed. No pertinent family history.    Social History     Social History   • Marital status:      Social History Main Topics   • Smoking status: Light Tobacco Smoker   • Alcohol use No   • Drug use: No     Other Topics Concern   • Not on file           Objective   Physical Exam   Constitutional: She is oriented to person, place, and time. She appears well-developed and well-nourished. No distress.   HENT:   Head: Atraumatic.   Mouth/Throat: Oropharynx is clear and moist.   Eyes: Conjunctivae and EOM are normal. Pupils are equal, round, and reactive to light.   Neck: Normal range of motion. Neck supple.   Cardiovascular: Normal rate and regular rhythm.    Pulmonary/Chest: Effort normal and breath sounds normal. No respiratory distress.   Abdominal: Soft. There is no tenderness.   Musculoskeletal: Normal range of motion.        Left shoulder: She exhibits tenderness. She exhibits normal range of motion, no swelling, no deformity, normal pulse and normal strength.        Left elbow: She exhibits normal range of motion and no swelling. No tenderness found.        Left wrist: She exhibits normal range of motion, no tenderness and no deformity.        Left hip: She exhibits tenderness. She exhibits normal range of motion, normal strength and no deformity.        Left knee: She exhibits normal range of motion and no swelling. No tenderness found.        Cervical back: She exhibits tenderness (Midline and left lateral soft tissue ). She exhibits normal range of motion, no swelling and no deformity.        Thoracic back: She exhibits normal range of motion and no tenderness.        Lumbar back: She exhibits tenderness (Diffuse , L>R). She exhibits normal range of motion,  "no swelling and no deformity.   Neurological: She is alert and oriented to person, place, and time. She has normal strength. No cranial nerve deficit or sensory deficit.   Skin: Skin is warm. Capillary refill takes less than 2 seconds.   Psychiatric: She has a normal mood and affect.   Nursing note and vitals reviewed.      Procedures         ED Course  ED Course      Discussed treatment plan with patient. She declined CT scan of head. Agreeable since no LOC and neurologically intact. Denies worst HA of life. She understands to return to ED if new or worse sx.   Discussed xray results. No fractures or dislocation per radiologist. Discussed ice, rest, NSAIDs for treatment of her contusions / strains. She has Flexeril at home to take already.     Recent Results (from the past 24 hour(s))   POCT Pregnancy, Urine    Collection Time: 03/14/18  2:03 PM   Result Value Ref Range    HCG, Urine, QL Negative Negative    Lot Number [id number]     Internal Positive Control Positive     Internal Negative Control Negative      Note: In addition to lab results from this visit, the labs listed above may include labs taken at another facility or during a different encounter within the last 24 hours. Please correlate lab times with ED admission and discharge times for further clarification of the services performed during this visit.    XR Spine Cervical 2 View    (Results Pending)   XR Shoulder 2+ View Left    (Results Pending)   XR Hip With or Without Pelvis 2 - 3 View Left    (Results Pending)   XR Spine Lumbar 2 or 3 View    (Results Pending)     Vitals:    03/14/18 1202   BP: (!) 168/103   BP Location: Left arm   Patient Position: Sitting   Pulse: 63   Resp: 14   Temp: 98.2 °F (36.8 °C)   TempSrc: Oral   SpO2: 96%   Weight: 122 kg (270 lb)   Height: 170.2 cm (67\")     Medications   acetaminophen (TYLENOL) tablet 1,000 mg (1,000 mg Oral Given 3/14/18 1338)                        MDM    Final diagnoses:   Fall, initial encounter "   Injury of head, initial encounter   Strain of neck muscle, initial encounter   Contusion of left shoulder, initial encounter   Strain of lumbar region, initial encounter   Contusion of left hip, initial encounter            ADILENE Hernadez  03/14/18 9687

## 2018-04-12 ENCOUNTER — TRANSCRIBE ORDERS (OUTPATIENT)
Dept: ADMINISTRATIVE | Facility: HOSPITAL | Age: 33
End: 2018-04-12

## 2018-04-12 DIAGNOSIS — S39.012A BACK STRAIN, INITIAL ENCOUNTER: Primary | ICD-10-CM

## 2018-04-19 ENCOUNTER — HOSPITAL ENCOUNTER (OUTPATIENT)
Dept: MRI IMAGING | Facility: HOSPITAL | Age: 33
Discharge: HOME | End: 2018-04-19
Admitting: NURSE PRACTITIONER

## 2018-04-19 DIAGNOSIS — S39.012A BACK STRAIN, INITIAL ENCOUNTER: ICD-10-CM

## 2018-04-19 PROCEDURE — 72158 MRI LUMBAR SPINE W/O & W/DYE: CPT

## 2018-04-19 PROCEDURE — 72157 MRI CHEST SPINE W/O & W/DYE: CPT

## 2018-04-19 PROCEDURE — A9577 INJ MULTIHANCE: HCPCS

## 2018-04-19 PROCEDURE — 0 GADOBENATE DIMEGLUMINE 529 MG/ML SOLUTION: Performed by: NURSE PRACTITIONER

## 2018-04-19 PROCEDURE — 0 GADOBENATE DIMEGLUMINE 529 MG/ML SOLUTION

## 2018-04-19 PROCEDURE — A9577 INJ MULTIHANCE: HCPCS | Performed by: NURSE PRACTITIONER

## 2018-04-19 RX ADMIN — GADOBENATE DIMEGLUMINE 20 ML: 529 INJECTION, SOLUTION INTRAVENOUS at 15:55

## 2018-05-24 ENCOUNTER — TRANSCRIBE ORDERS (OUTPATIENT)
Dept: ADMINISTRATIVE | Facility: HOSPITAL | Age: 33
End: 2018-05-24

## 2018-05-24 DIAGNOSIS — M25.512 LEFT SHOULDER PAIN, UNSPECIFIED CHRONICITY: ICD-10-CM

## 2018-05-24 DIAGNOSIS — R10.9 STOMACH ACHE: ICD-10-CM

## 2018-05-24 DIAGNOSIS — S46.819A: Primary | ICD-10-CM

## 2018-05-24 DIAGNOSIS — M54.2 NECK PAIN: ICD-10-CM

## 2018-06-04 ENCOUNTER — APPOINTMENT (OUTPATIENT)
Dept: MRI IMAGING | Facility: HOSPITAL | Age: 33
End: 2018-06-04

## 2018-06-04 ENCOUNTER — HOSPITAL ENCOUNTER (OUTPATIENT)
Dept: ULTRASOUND IMAGING | Facility: HOSPITAL | Age: 33
Discharge: HOME | End: 2018-06-04
Admitting: NURSE PRACTITIONER

## 2018-06-04 DIAGNOSIS — R10.9 STOMACH ACHE: ICD-10-CM

## 2018-06-04 PROCEDURE — 76700 US EXAM ABDOM COMPLETE: CPT

## 2018-06-14 ENCOUNTER — HOSPITAL ENCOUNTER (OUTPATIENT)
Dept: MRI IMAGING | Facility: HOSPITAL | Age: 33
Discharge: HOME | End: 2018-06-14

## 2018-06-14 DIAGNOSIS — M25.512 LEFT SHOULDER PAIN, UNSPECIFIED CHRONICITY: ICD-10-CM

## 2018-06-14 DIAGNOSIS — M54.2 NECK PAIN: ICD-10-CM

## 2018-06-14 DIAGNOSIS — S46.819A: ICD-10-CM

## 2018-06-15 ENCOUNTER — HOSPITAL ENCOUNTER (OUTPATIENT)
Dept: MRI IMAGING | Facility: HOSPITAL | Age: 33
Discharge: HOME | End: 2018-06-15
Admitting: NURSE PRACTITIONER

## 2018-06-15 PROCEDURE — 73221 MRI JOINT UPR EXTREM W/O DYE: CPT

## 2018-06-15 PROCEDURE — 72141 MRI NECK SPINE W/O DYE: CPT

## 2018-07-25 ENCOUNTER — TRANSCRIBE ORDERS (OUTPATIENT)
Dept: PHYSICAL THERAPY | Facility: CLINIC | Age: 33
End: 2018-07-25

## 2018-07-25 ENCOUNTER — TREATMENT (OUTPATIENT)
Dept: PHYSICAL THERAPY | Facility: CLINIC | Age: 33
End: 2018-07-25

## 2018-07-25 DIAGNOSIS — M54.2 PAIN, NECK: Primary | ICD-10-CM

## 2018-07-25 DIAGNOSIS — M54.2 CERVICALGIA: Primary | ICD-10-CM

## 2018-07-25 DIAGNOSIS — M54.50 CHRONIC LEFT-SIDED LOW BACK PAIN WITHOUT SCIATICA: ICD-10-CM

## 2018-07-25 DIAGNOSIS — G89.29 CHRONIC LEFT-SIDED LOW BACK PAIN WITHOUT SCIATICA: ICD-10-CM

## 2018-07-25 PROCEDURE — 97110 THERAPEUTIC EXERCISES: CPT | Performed by: PHYSICAL THERAPIST

## 2018-07-25 PROCEDURE — 97035 APP MDLTY 1+ULTRASOUND EA 15: CPT | Performed by: PHYSICAL THERAPIST

## 2018-07-25 PROCEDURE — 97001 PR PHYS THERAPY EVALUATION: CPT | Performed by: PHYSICAL THERAPIST

## 2018-07-25 PROCEDURE — 97014 ELECTRIC STIMULATION THERAPY: CPT | Performed by: PHYSICAL THERAPIST

## 2018-07-25 NOTE — PROGRESS NOTES
Physical Therapy Initial Evaluation and Plan of Care    TOTAL TIME: 75 MINUTES    Subjective Evaluation    History of Present Illness  Mechanism of injury: 33 y.o. female   Pain ranges from 5-10 constantly    Slipped and fell on ice in March, walking down a drive way and landed on left side ; states she thinks she hit her head and hip mostly; when she got up her left side was hurting     Pain increases in lumbar spine with: standing > 15 minutes, bending over, turning, sitting without support, sleeping    Pain increases in neck with: carrying things, sleeping, turning head        Occupation:  for [employer]  x 8 years; does a lot of computer work and paper work    Has been off of work since March     Can't sit or drive very far; pain with prolonged sitting, pain with turning head while trying to drive; states she can't lift very much, unable to carry purse; feels like head is heavy on her neck; feels pulling and burning  Complains mostly of low back pain, but neck hurts constantly as well;  States she must use a wheelchair in grocery store, recently had to [facility] a wheelchair [facility]; complains of cervicogenic type headaches along with dizziness     Has done chiropractic care- states it helped some but after the ten approved visits the pain came right back    F/u with Dr. Irene in August        Patient Occupation:    Precautions and Work Restrictions: unsure exactly of her restrictions- has been off of work since the injuryQuality of life: poor    Pain  Current pain ratin  At best pain ratin  At worst pain rating: 10  Quality: dull ache, burning, sharp, pulling and discomfort  Relieving factors: medications, change in position, heat and ice  Aggravating factors: lifting, movement and prolonged positioning    Social Support  Lives with: spouse    Treatments  Previous treatment: chiropractic and medication  Current treatment:  medication  Patient Goals  Patient goals for therapy: decreased pain, increased motion, independence with ADLs/IADLs, return to sport/leisure activities and return to work             Objective       Postural Observations  Seated posture: poor  Standing posture: poor    Additional Postural Observation Details  Leans to the right while sitting during evaluation    RHD    Transfers between positions without difficulty     Complains of occasional numbness and tingling down the left arm; swelling in fingers  Feels like may have some  strength loss    Palpation   Left   Tenderness of the cervical paraspinals, levator scapulae, lumbar paraspinals, quadratus lumborum, suboccipitals and upper trapezius.     Neurological Testing     Sensation   Cervical/Thoracic   Left   Intact: light touch    Right   Intact: light touch    Lumbar   Left   Intact: light touch    Right   Intact: light touch    Reflexes   Left   Biceps (C5/C6): normal (2+)  Brachioradialis (C6): normal (2+)  Triceps (C7): normal (2+)  Patellar (L4): normal (2+)  Achilles (S1): normal (2+)    Right   Biceps (C5/C6): normal (2+)  Brachioradialis (C6): normal (2+)  Triceps (C7): normal (2+)  Patellar (L4): normal (2+)  Achilles (S1): normal (2+)    Active Range of Motion   Cervical/Thoracic Spine   Cervical    Flexion: WFL  Extension: WFL and with pain  Left lateral flexion: WFL  Right lateral flexion: WFL  Left rotation: 70 degrees with pain  Right rotation: WFL    Lumbar   Flexion: Active lumbar flexion: hands to knees. with pain  Extension: 10 degrees with pain  Left lateral flexion: WFL and with pain  Right lateral flexion: WFL and with pain  Left rotation: 60 degrees with pain  Right rotation: 70 degrees with pain    Strength/Myotome Testing   Cervical Spine     Left   Normal strength    Right   Normal strength    Lumbar   Left   Normal strength  Heel walk: normal  Toe walk: normal    Right   Normal strength  Heel walk: normal  Toe walk: normal    Tests  "  Cervical     Left   Positive Spurling's sign.     Left Shoulder   Positive active compression ([place]).     Lumbar     Left   Negative passive SLR.     Right   Negative passive SLR.     Left Pelvic Girdle/Sacrum   Negative: sacrum compression, gapping and sacral spring.     Right Pelvic Girdle/Sacrum   Negative: sacrum compression, gapping and sacral spring.          Assessment & Plan     Assessment  Impairments: abnormal gait, abnormal or restricted ROM, activity intolerance, impaired physical strength, lacks appropriate home exercise program and pain with function  Assessment details: 32 yo female presents with 4 month chronic history of lumbar and cervical pain after a fall on the ice while working as a ; has had multiple MRI's and x rays; has been off work since the injury; has not done PT before now, she states she refused PT initially and did some chiro care; after 10 visits she was slightly better, but when she stopped the chiro care the pain returned within 2-3 days; patient complains of lumbar>cervical pain that is constant in nature and ranges from 5-10/10 (currently 7/10) and causes headaches and dropping of things ( strength was WNL's and equal bilaterally); ROM for lumbar and cervical spine is WFL's but painful at end range with most planes; may benefit from PT to restore full pain-free ROM and strength in order to RTW on full duty without pain or dysfunction; she states her job is \"75% sedentary and driving, and the other 20-30% is paperwork\"   Prognosis: fair  Prognosis details: Chronic pain  Patient somewhat skeptical of PT- \"I had a bad experience with PT in the past\"   Functional Limitations: carrying objects, lifting, sleeping, walking, pulling, pushing, uncomfortable because of pain, moving in bed, sitting, standing, stooping, reaching overhead and unable to perform repetitive tasks  Goals  Plan Goals: 3 weeks:  1. IND with HEP  2. Full pain free ROM of cervical and lumbar " spine  3. No TTP paraspinal mm of cervical or lumbar spine    6 weeks:  1. Able to perform work simulated strengthening and endurance tasks without pain  2. Able to ambulate up to one mile without pain and sit for > 30 minutes while performing fine motor coordination tasks without excessive increase in pain  3. Pain < or equal to 3/10 at worst     Plan  Therapy options: will be seen for skilled physical therapy services  Planned modality interventions: iontophoresis, TENS, thermotherapy (hydrocollator packs), traction, ultrasound, high voltage pulsed current (pain management), electrical stimulation/Russian stimulation and cryotherapy  Planned therapy interventions: abdominal trunk stabilization, body mechanics training, fine motor coordination training, flexibility, functional ROM exercises, gait training, home exercise program, joint mobilization, manual therapy, neuromuscular re-education, postural training, soft tissue mobilization, spinal/joint mobilization, strengthening, stretching and therapeutic activities  Frequency: 2x week  Duration in weeks: 6  Treatment plan discussed with: patient        Manual Therapy:         mins  25194;  Therapeutic Exercise:    20     mins  12484;     Neuromuscular Angel:        mins  67622;    Therapeutic Activity:          mins  36198;     Gait Training:           mins  29180;     Ultrasound:     10     mins  35940;    Electrical Stimulation:    15     mins  43622 ( );  Dry Needling          mins self-pay    Timed Treatment:   45   mins   Total Treatment:     75   mins    PT SIGNATURE: Troy Kiran, PT   DATE TREATMENT INITIATED: 7/25/2018    Initial Certification  Certification Period: 10/23/2018  I certify that the therapy services are furnished while this patient is under my care.  The services outlined above are required by this patient, and will be reviewed every 90 days.     PHYSICIAN:       DATE:     Please sign and return via fax to  .. Thank you, [facility]  Physical Therapy.

## 2018-07-30 ENCOUNTER — TREATMENT (OUTPATIENT)
Dept: PHYSICAL THERAPY | Facility: CLINIC | Age: 33
End: 2018-07-30

## 2018-07-30 DIAGNOSIS — M54.2 PAIN, NECK: Primary | ICD-10-CM

## 2018-07-30 PROCEDURE — 97110 THERAPEUTIC EXERCISES: CPT | Performed by: PHYSICAL THERAPIST

## 2018-07-30 NOTE — PROGRESS NOTES
"Physical Therapy Daily Progress Note    TOTAL TIME: 80 MINUTES    Mariann Cade reports: \"about the same\" - states she was OOT all weekend, so did not have as much time to do her HEP as she would have liked      Objective   See Exercise, Manual, and Modality Logs for complete treatment.     THERAPEUTIC EXERCISES/ACTIVITIES ADDED TODAY: see flow sheets in media section for updated lumbar spine exercises; Nu Step     Assessment/Plan  PATIENT NEEDS CONTINUED PHYSICAL THERAPY IN ORDER TO ACHIEVE FULL ROM, STRENGTH AND FUNCTION WITH NO REPORTS OF PAIN IN ORDER TO RTW WITHOUT RESTRICTIONS AND WITHOUT PAIN OR DYSFUNCTION       Progress per Plan of Care           Manual Therapy:         mins  61147;  Therapeutic Exercise:    65     mins  41656;     Neuromuscular Angel:        mins  78032;    Therapeutic Activity:          mins  45539;     Gait Training:           mins  47998;     Ultrasound:          mins  40950;    Electrical Stimulation:    15     mins  04864 ( );  Dry Needling          mins self-pay    Timed Treatment:   80   mins   Total Treatment:     80   mins    Troy Kiran, PT  Physical Therapist  "

## 2018-08-01 ENCOUNTER — TREATMENT (OUTPATIENT)
Dept: PHYSICAL THERAPY | Facility: CLINIC | Age: 33
End: 2018-08-01

## 2018-08-01 DIAGNOSIS — M54.2 PAIN, NECK: Primary | ICD-10-CM

## 2018-08-01 PROCEDURE — 97110 THERAPEUTIC EXERCISES: CPT | Performed by: PHYSICAL THERAPIST

## 2018-08-01 PROCEDURE — 97014 ELECTRIC STIMULATION THERAPY: CPT | Performed by: PHYSICAL THERAPIST

## 2018-08-06 ENCOUNTER — TREATMENT (OUTPATIENT)
Dept: PHYSICAL THERAPY | Facility: CLINIC | Age: 33
End: 2018-08-06

## 2018-08-06 DIAGNOSIS — M54.50 CHRONIC LEFT-SIDED LOW BACK PAIN WITHOUT SCIATICA: Primary | ICD-10-CM

## 2018-08-06 DIAGNOSIS — G89.29 CHRONIC LEFT-SIDED LOW BACK PAIN WITHOUT SCIATICA: Primary | ICD-10-CM

## 2018-08-06 PROCEDURE — 97110 THERAPEUTIC EXERCISES: CPT | Performed by: PHYSICAL THERAPIST

## 2018-08-06 PROCEDURE — 97014 ELECTRIC STIMULATION THERAPY: CPT | Performed by: PHYSICAL THERAPIST

## 2018-08-06 NOTE — PROGRESS NOTES
Physical Therapy Daily Progress Note    TOTAL TIME: 65 MINUTES    Mariann Cade reports: back hurting a lot today, worked at a vendor show over the weekend, wore back brace but it made it hurt; also did more housework over the weekend; thought it was feeling better so I thought I could do more with it      Objective   See Exercise, Manual, and Modality Logs for complete treatment.     THERAPEUTIC EXERCISES/ACTIVITIES ADDED TODAY: see flow sheets    Assessment/Plan  PATIENT NEEDS CONTINUED PHYSICAL THERAPY IN ORDER TO ACHIEVE FULL ROM, STRENGTH AND FUNCTION WITH NO REPORTS OF PAIN IN ORDER TO RTW WITHOUT RESTRICTIONS AND WITHOUT PAIN OR DYSFUNCTION       Progress per Plan of Care           Manual Therapy:         mins  19083;  Therapeutic Exercise:    50     mins  75900;     Neuromuscular Angel:        mins  06913;    Therapeutic Activity:          mins  49637;     Gait Training:           mins  64190;     Ultrasound:          mins  75182;    Electrical Stimulation:    15     mins  42510 ( );  Dry Needling          mins self-pay    Timed Treatment:   65   mins   Total Treatment:     65   mins    Troy Kiran PT  Physical Therapist

## 2018-08-08 ENCOUNTER — TREATMENT (OUTPATIENT)
Dept: PHYSICAL THERAPY | Facility: CLINIC | Age: 33
End: 2018-08-08

## 2018-08-08 DIAGNOSIS — M54.50 CHRONIC LEFT-SIDED LOW BACK PAIN WITHOUT SCIATICA: Primary | ICD-10-CM

## 2018-08-08 DIAGNOSIS — G89.29 CHRONIC LEFT-SIDED LOW BACK PAIN WITHOUT SCIATICA: Primary | ICD-10-CM

## 2018-08-08 PROCEDURE — 97110 THERAPEUTIC EXERCISES: CPT | Performed by: PHYSICAL THERAPIST

## 2018-08-08 PROCEDURE — 97014 ELECTRIC STIMULATION THERAPY: CPT | Performed by: PHYSICAL THERAPIST

## 2018-08-09 NOTE — PROGRESS NOTES
Physical Therapy Daily Progress Note    TOTAL TIME: 60 MINUTES    Mariann Cade reports: patient arrived 20 minutes late      Objective   See Exercise, Manual, and Modality Logs for complete treatment.     THERAPEUTIC EXERCISES/ACTIVITIES ADDED TODAY: see flow sheets    Assessment/Plan  PATIENT NEEDS CONTINUED PHYSICAL THERAPY IN ORDER TO ACHIEVE FULL ROM, STRENGTH AND FUNCTION WITH NO REPORTS OF PAIN IN ORDER TO RTW WITHOUT RESTRICTIONS AND WITHOUT PAIN OR DYSFUNCTION       Progress per Plan of Care           Manual Therapy:         mins  50940;  Therapeutic Exercise:    45     mins  50974;     Neuromuscular Angel:        mins  80667;    Therapeutic Activity:          mins  21192;     Gait Training:           mins  70807;     Ultrasound:          mins  31471;    Electrical Stimulation:    15     mins  42690 ( );  Dry Needling          mins self-pay    Timed Treatment:   60   mins   Total Treatment:     60   mins    Troy Kiran, PT  Physical Therapist

## 2018-08-15 ENCOUNTER — TREATMENT (OUTPATIENT)
Dept: PHYSICAL THERAPY | Facility: CLINIC | Age: 33
End: 2018-08-15

## 2018-08-15 DIAGNOSIS — M54.2 PAIN, NECK: Primary | ICD-10-CM

## 2018-08-15 PROCEDURE — 97014 ELECTRIC STIMULATION THERAPY: CPT | Performed by: PHYSICAL THERAPIST

## 2018-08-15 PROCEDURE — 97110 THERAPEUTIC EXERCISES: CPT | Performed by: PHYSICAL THERAPIST

## 2018-08-17 ENCOUNTER — TREATMENT (OUTPATIENT)
Dept: PHYSICAL THERAPY | Facility: CLINIC | Age: 33
End: 2018-08-17

## 2018-08-17 DIAGNOSIS — M54.2 PAIN, NECK: Primary | ICD-10-CM

## 2018-08-17 PROCEDURE — 97014 ELECTRIC STIMULATION THERAPY: CPT | Performed by: PHYSICAL THERAPIST

## 2018-08-17 PROCEDURE — 97110 THERAPEUTIC EXERCISES: CPT | Performed by: PHYSICAL THERAPIST

## 2018-08-20 NOTE — PROGRESS NOTES
Physical Therapy Daily Progress Note    TOTAL TIME: 70 MINUTES    Mariann Cade reports: getting better slowly      Objective   See Exercise, Manual, and Modality Logs for complete treatment.     THERAPEUTIC EXERCISES/ACTIVITIES ADDED TODAY: see flow sheets    Assessment/Plan  PATIENT NEEDS CONTINUED PHYSICAL THERAPY IN ORDER TO ACHIEVE FULL ROM, STRENGTH AND FUNCTION WITH NO REPORTS OF PAIN IN ORDER TO RTW WITHOUT RESTRICTIONS AND WITHOUT PAIN OR DYSFUNCTION       Progress per Plan of Care           Manual Therapy:         mins  74175;  Therapeutic Exercise:    55     mins  82304;     Neuromuscular Angel:        mins  38582;    Therapeutic Activity:          mins  02048;     Gait Training:           mins  17901;     Ultrasound:          mins  55891;    Electrical Stimulation:    15     mins  26352 ( );  Dry Needling          mins self-pay    Timed Treatment:   70   mins   Total Treatment:     70   mins    Troy Kiran, PT  Physical Therapist

## 2018-08-21 NOTE — PROGRESS NOTES
Physical Therapy Daily Progress Note    TOTAL TIME: 60 MINUTES    Mariann Cade reports: patient states feeling better, stretching helps; low back a little sore today      Objective   See Exercise, Manual, and Modality Logs for complete treatment.     THERAPEUTIC EXERCISES/ACTIVITIES ADDED TODAY: see flow sheets      Assessment/Plan  PATIENT NEEDS CONTINUED PHYSICAL THERAPY IN ORDER TO ACHIEVE FULL ROM, STRENGTH AND FUNCTION WITH NO REPORTS OF PAIN IN ORDER TO RTW WITHOUT RESTRICTIONS AND WITHOUT PAIN OR DYSFUNCTION        Progress per Plan of Care           Manual Therapy:         mins  68660;  Therapeutic Exercise:    45     mins  97449;     Neuromuscular Angel:        mins  25144;    Therapeutic Activity:          mins  20753;     Gait Training:           mins  29238;     Ultrasound:          mins  04709;    Electrical Stimulation:    15     mins  23442 ( );  Dry Needling          mins self-pay    Timed Treatment:   60   mins   Total Treatment:     60   mins    Troy Kiran PT  Physical Therapist

## 2018-09-24 ENCOUNTER — HOSPITAL ENCOUNTER (OUTPATIENT)
Dept: PHYSICAL THERAPY | Facility: HOSPITAL | Age: 33
Setting detail: THERAPIES SERIES
Discharge: HOME | End: 2018-09-24

## 2018-09-24 ENCOUNTER — TRANSCRIBE ORDERS (OUTPATIENT)
Dept: PHYSICAL THERAPY | Facility: HOSPITAL | Age: 33
End: 2018-09-24

## 2018-09-24 DIAGNOSIS — S83.512D RUPTURE OF ANTERIOR CRUCIATE LIGAMENT OF LEFT KNEE, SUBSEQUENT ENCOUNTER: ICD-10-CM

## 2018-09-24 DIAGNOSIS — M25.562 CHRONIC PAIN OF LEFT KNEE: Primary | ICD-10-CM

## 2018-09-24 DIAGNOSIS — S83.519A RUPTURE OF ANTERIOR CRUCIATE LIGAMENT OF KNEE, UNSPECIFIED LATERALITY, INITIAL ENCOUNTER: Primary | ICD-10-CM

## 2018-09-24 DIAGNOSIS — G89.29 CHRONIC LEFT-SIDED LOW BACK PAIN WITHOUT SCIATICA: ICD-10-CM

## 2018-09-24 DIAGNOSIS — M54.2 CERVICAL PAIN (NECK): ICD-10-CM

## 2018-09-24 DIAGNOSIS — M54.50 CHRONIC LEFT-SIDED LOW BACK PAIN WITHOUT SCIATICA: ICD-10-CM

## 2018-09-24 DIAGNOSIS — M54.2 PAIN, NECK: Primary | ICD-10-CM

## 2018-09-24 DIAGNOSIS — G89.29 CHRONIC PAIN OF LEFT KNEE: Primary | ICD-10-CM

## 2018-09-24 PROCEDURE — 97161 PT EVAL LOW COMPLEX 20 MIN: CPT | Performed by: PHYSICAL THERAPIST

## 2018-09-24 PROCEDURE — 97110 THERAPEUTIC EXERCISES: CPT | Performed by: PHYSICAL THERAPIST

## 2018-09-24 PROCEDURE — 97162 PT EVAL MOD COMPLEX 30 MIN: CPT | Performed by: PHYSICAL THERAPIST

## 2018-09-25 NOTE — THERAPY PROGRESS REPORT/RE-CERT
"    Outpatient Physical Therapy Ortho Re-Assessment  [facility]     Patient Name: Mariann Cade  : 1985  MRN: [id number]  Today's Date: 2018      Visit Date: 2018    There is no problem list on file for this patient.       Past Medical History:   Diagnosis Date   • Anxiety    • Chronic constipation    • Depression    • GERD (gastroesophageal reflux disease)    • Headache    • Injury of back    • Injury of neck         Past Surgical History:   Procedure Laterality Date   • BREAST SURGERY     • CHOLECYSTECTOMY     • GASTRIC SLEEVE LAPAROSCOPIC         Visit Dx:     ICD-10-CM ICD-9-CM   1. Pain, neck M54.2 723.1   2. Chronic left-sided low back pain without sciatica M54.5 724.2    G89.29 338.29             Patient History     Row Name  18 1600          History    Chief Complaint  Pain  -NIEVES     Type of Pain  Back pain;Neck pain  -NIEVES     Date Current Problem(s) Began  18  -NIEVES     Brief Description of Current Complaint  Patient states that she slipped on ice and fell landing onto her head/hip region.  She notes left sided pain when she got up, she notes that CT noted bone spurring along the cervical spine.  She notes pain increases with activities generally but she does have intermittent times without pain.  She notes the pain is along the lumbar spine and into the left hip with left periaural pain and referral with tenderness and HAs.  She notes pressure and tightness with \"pulling\" into the left upper extremity to the forearm with occasional \"swelling and tingling\" in the hands.  -NIEVES     Current Tobacco Use  nonuser  -NIEVES     Hand Dominance  right-handed  -NIEVES     Occupation/sports/leisure activities  social work, currently not working.  hobbies: teenage children.  -NIEVES     What clinical tests have you had for this problem?  MRI  -NIEVES     Results of Clinical Tests  negative back, C6/7 bone spurring with left neuroforaminal stenosis.  -NIEVES     Are you or can you be pregnant  No  -NIEVES        " Pain     Pain Location  Neck;Back  -NIEVES     Pain at Present  7  -NIEVES     Pain at Best  0  -NIEVES     Pain at Worst  8  -NIEVES     What Performance Factors Make the Current Problem(s) WORSE?  activity: turning head left>right, lifting, carrying things in the left arm  -NIEVES     What Performance Factors Make the Current Problem(s) BETTER?  laying in recliner  -NIEVES     Difficulties at work?  not working currently, carrying items, driving, walking  -NIEVES     Difficulties with ADL's?  hair care, dressing, standing   -NIEVES     Difficulties with recreational activities?   --        Fall Risk Assessment    Any falls in the past year:  Yes  -NIEVES     Number of falls reported in the last 12 months  2  -NIEVES     Factors that contributed to the fall:  Slippery surface;Other (comment)   back pain  -NIEVES     Does patient have a fear of falling  Yes (comment)  -NIEVES        Daily Activities    Primary Language  English  -NIEVES     Barriers to learning  None  -NIEVES     Pt Participated in POC and Goals  Yes  -NIEVES        Safety    Are you being hurt, hit, or frightened by anyone at home or in your life?  No  -NIEVES       User Key  (r) = Recorded By, (t) = Taken By, (c) = Cosigned By    Initials Name Provider Type    NIEVES David Payton, PT Physical Therapist                PT Ortho     Row Name        Posture/Observations    Posture/Observations Comments        Special Tests/Palpation    Special Tests/Palpation        Knee Palpation    Knee Palpation?     Patella Tendon     Pes Anserine Bursa     Medial Joint Line     Lateral Joint Line     Medial Gastroc Head     Lateral Gastroc Head        Patellar Accessory Motions    Patellar Accessory Motions Tested?     Superior glide     Inferior glide     Medial glide     Lateral glide     Lateral tilt        Knee Special Tests    Anterior drawer (ACL lesion)     Lachman’s (ACL lesion)     Posterior drawer (PCL lesion)     Valgus stress (MCL lesion)     Varus stress (LCL lesion)     Apley’s compression test (meniscal lesion  vs OA)     Lisa’s test (meniscal lesion)     Ann test (chondromalacia patella)        General ROM    RT Lower Ext     LT Lower Ext        Right Lower Ext    Rt Knee Extension/Flexion AROM        Left Lower Ext    Lt Knee Extension/Flexion AROM        MMT (Manual Muscle Testing)    Rt Lower Ext     Lt Lower Ext        MMT Right Lower Ext    Rt Hip Extension MMT, Gross Movement     Rt Hip ABduction MMT, Gross Movement     Rt Knee Extension MMT, Gross Movement     Rt Knee Flexion MMT, Gross Movement     Rt Ankle Dorsiflexion MMT, Gross Movement        MMT Left Lower Ext    Lt Hip Extension MMT, Gross Movement     Lt Hip ABduction MMT, Gross Movement     Lt Knee Extension MMT, Gross Movement     Lt Knee Flexion MMT, Gross Movement     Lt Ankle Dorsiflexion MMT, Gross Movement        Gait/Stairs Assessment/Training    Comment (Gait/Stairs)  -NIEVES    Row Name 09/24/18 1600       Posture/Observations    Posture/Observations Comments        Quarter Clearing    Quarter Clearing          DTR- Upper Quarter Clearing    Biceps (C5/6)     Brachioradialis (C6)     Triceps (C7)        Neural Tension Signs- Upper Quarter Clearing    ULNTT 1     ULNTT 2     ULNTT 3     ULNTT 4        Sensory Screen for Light Touch- Upper Quarter Clearing    C4 (posterior shoulder)     C5 (lateral upper arm)     C6 (tip of thumb)     C7 (tip of 3rd finger)     C8 (tip of 5th finger)     T1 (medial lower arm)        Myotomal Screen- Upper Quarter Clearing    Shoulder flexion (C5)     Elbow flexion/wrist extension (C6)     Elbow extension/wrist flexion (C7)        Cervical/Shoulder ROM Screen    Cervical flexion     Cervical extension     Cervical lateral flexion     Cervical rotation     Shoulder elevation         DTR- Lower Quarter Clearing    Patellar tendon (L2-4)     Achilles tendon (S1-2)        Neural Tension Signs- Lower Quarter Clearing    Slump     SLR        Pathological Reflexes- Lower Quarter Clearing    Clonus     Miracle         Sensory Screen for Light Touch- Lower Quarter Clearing    L2 (anterior mid thigh)     L3 (distal anterior thigh)     L4 (medial lower leg/foot)     L5 (lateral lower leg/great toe)     S1 (bottom of foot)        Myotomal Screen- Lower Quarter Clearing    Hip flexion (L2)     Knee extension (L3)     Ankle DF (L4)     Great toe extension (L5)     Ankle PF (S1)     Knee flexion (S2)        Lumbar ROM Screen- Lower Quarter Clearing    Lumbar Flexion     Lumbar Extension     Lumbar Lateral Flexion        SI/Hip Screen- Lower Quarter Clearing    Andrew's/Harry's test     Posterior thigh sheer        Special Tests/Palpation    Special Tests/Palpation        Cervical Palpation    Cervical Palpation- Location?     Suboccipital     SCM     AC Joint     Cervical Facets     Scalenes     Spinous Process     Levator Scapula     Upper Traps        Thoracic Accessory Motions    Thoracic Accessory Motions Tested?     Pa glide- Upper thoracic     Pa glide- Middle thoracic     Pa glide- Lower thoracic        Cervical/Thoracic Special Tests    Spurlings (Foraminal Compression)     Cervical Compression (Forarminal Compression vs. Facet Pain)     Cervical Distraction (Foraminal Compression vs. Facet Pain)        Lumbar/SI Special Tests    Sacral Spring Test (SI Dysfunction)     Lumbar/SI Special Tests Comments        Lumbosacral Palpation    Lumbosacral Palpation?     SI     Lumbosacral Segment     Spinous Process     Piriformis     Gluteus Ty     Erector Spinae (Paraspinals)        Heidi's Signs    Superficial and non-anatomical tenderness     Simulation test     Distraction straight leg raise test (sitting vs supine)     Regional disturbances     Overreaction to examination        MMT (Manual Muscle Testing)    General MMT Comments        Gait/Stairs Assessment/Training    Comment (Gait/Stairs)       User Key  (r) = Recorded By, (t) = Taken By, (c) = Cosigned By    Initials Name Provider Type    David Head, PT Physical  Therapist                                  PT OP Goals     Row Name  09/24/18 1600       PT Short Term Goals    STG Date to Achieve  10/08/18  -NIEVES    STG 1  Patient to be compliant with initial cervical/lumbar HEP  -NIEVES    STG 1 Progress  New  -NIEVES    STG 2  Patient to report reduced HA frequency  -NIEVES    STG 2 Progress  New  -NIEVES    STG 3   --    STG 3 Progress   --       Long Term Goals    LTG Date to Achieve  10/22/18  -NIEVES    LTG 1  Patient to be independent with final HEP  -NIEVES    LTG 1 Progress  New  -NIEVES    LTG 2  Patient to tolerate lumbar flexion to at least 75 degrees without pain  -NIEVES    LTG 2 Progress  New  -NIEVES    LTG 3  Patient to demonstrate right cervical rotation at least 50 degrees without pain  -NIEVES    LTG 3 Progress  New  -NIEVES    LTG 4  Patient to improve NDI score to at least 60%  -NIEVES    LTG 4 Progress  New  -NIEVES    LTG 5  Patient to improve Modified Oswestry score to at least 60%  -NIEVES    LTG 5 Progress  New  -NIEVES       Time Calculation    PT Goal Re-Cert Due Date  12/23/18  -NIEVES      User Key  (r) = Recorded By, (t) = Taken By, (c) = Cosigned By    Initials Name Provider Type    David Head, PT Physical Therapist                PT Assessment/Plan     Row Name  09/24/18 1600       PT Assessment    Functional Limitations  Performance in work activities;Performance in self-care ADL;Performance in leisure activities;Limitations in functional capacity and performance;Limitation in home management;Impaired gait  -NIEVES    Impairments  Gait;Range of motion;Posture;Poor body mechanics;Pain;Joint mobility;Joint integrity;Impaired flexibility  -NIEVES    Assessment Comments  Patient presents with evolving complaints of neck and lower back pain that she injured during a fall in March.  She has attended PT x6 visits at another facility and has transferred care to here.  She complains of primarily left sided neck pain with HAs beginning in the scalene region and radiating into the left trap and into the LUE to the  elbow/forearm.  She also has concurrent lumbar pain primarily left sided that is more vague in presentation.  The pain is increased with generalized activity and significantly impairs her ability to work, perform leisure activities, and even ADLs.    -NIEVES    Please refer to paper survey for additional self-reported information  Yes  -NIEVES    Rehab Potential  Fair  -NIEVES    Patient/caregiver participated in establishment of treatment plan and goals  Yes  -NIEVES    Patient would benefit from skilled therapy intervention  Yes  -NIEVES       PT Plan    PT Frequency  2x/week  -NIEVES    Predicted Duration of Therapy Intervention (Therapy Eval)  8 visits  -NIEVES    Planned CPT's?  PT EVAL MOD COMPLELITY: 17873;PT THER PROC EA 15 MIN: 87084;PT MANUAL THERAPY EA 15 MIN: 14191;PT RE-EVAL: 98960;PT GAIT TRAINING EA 15 MIN: 57247;PT ELECTRICAL STIM UNATTEND: ;PT ULTRASOUND EA 15 MIN: 56159;PT HOT/COLD PACK WC NONMCARE: 53625;PT TRACTION CERVICAL: 18024;PT TRACTION LUMBAR: 36619  -NIEVES    PT Plan Comments  postural exercises, scapular and core strengthening, hip stretching, manual and modalities as indicated.  -NIEVES      User Key  (r) = Recorded By, (t) = Taken By, (c) = Cosigned By    Initials Name Provider Type    David Head, PT Physical Therapist                  Exercises     Row Name 09/24/18 1600             Total Minutes    05596 - PT Therapeutic Exercise Minutes 40   Patient was seen for two different encounters, 40 minutes on neck/back for work comp  -NIEVES         Exercise 1    Exercise Name 1 Reassessment performed today due to transfer of care.  -NIEVES         Exercise 2    Exercise Name 2 scalene stretch  -NIEVES      Sets 2 2  -NIEVES      Time 2 30 seconds  -NIEVES         Exercise 3    Exercise Name 3 upper trap stretch  -NIEVES      Sets 3 2  -NIEVES      Time 3 30 seconds  -NIEVES         Exercise 4    Exercise Name 4 levator stretch  -NIEVES      Sets 4 2  -NIEVES      Time 4 30 seconds  -NIEVES        User Key  (r) = Recorded By, (t) = Taken By, (c) =  Cosigned By    Initials Name Provider Type    David Head, PT Physical Therapist                        Outcome Measure Options: Modifed Isaacy, Neck Disability Index (NDI)  Modified Oswestry  Modified Oswestry Score/Comments: 82%  Neck Disability Index  Section 1 - Pain Intensity: The pain is fairly severe at the moment.  Section 2 - Personal Care: I need help every day in most aspects of self-care.  Section 3 - Lifting: I can lift only very light weights.  Section 4 - Work: I can hardly do any work at all.  Section 5 - Headaches: I have headaches almost all the time.  Section 6 - Concentration: I can't concentrate at all.  Section 7 - Sleeping: My sleep is completely disturbed for up to 5-7 hours.  Section 8 - Driving: I can't drive as long as I want because of moderate neck pain.  Section 9 - Reading: I can't read as much as I want because of severe neck pain.  Section 10 - Recreation: I can hardly do recreational activities due to neck pain.  Neck Disability Index Score: 41  Neck Disability Index Comments: 82%      Time Calculation: 1552 start time. 40 minutes spent on work comp encounter for neck/back pain.    Therapy Suggested Charges     Code   Minutes Charges    16555 (CPT®) Hc Pt Neuromusc Re Education Ea 15 Min      81064 (CPT®) Hc Pt Ther Proc Ea 15 Min 40 3    03506 (CPT®) Hc Gait Training Ea 15 Min      26370 (CPT®) Hc Pt Therapeutic Act Ea 15 Min      54297 (CPT®) Hc Pt Manual Therapy Ea 15 Min      16571 (CPT®) Hc Pt Ther Massage- Per 15 Min      64015 (CPT®) Hc Pt Iontophoresis Ea 15 Min      38455 (CPT®) Hc Pt Elec Stim Ea-Per 15 Min      79777 (CPT®) Hc Pt Ultrasound Ea 15 Min      66712 (CPT®) Hc Pt Self Care/Mgmt/Train Ea 15 Min      03327 (CPT®) Hc Pt Prosthetic (S) Train Initial Encounter, Each 15 Min      91822 (CPT®) Hc Orthotic(S) Mgmt/Train Initial Encounter, Each 15min      08607 (CPT®) Hc Pt Aquatic Therapy Ea 15 Min      09600 (CPT®) Hc Pt Orthotic(S)/Prosthetic(S) Encounter,  Each 15 Min      Total  40 3          Start Time: 1552     Therapy Charges for Today     Code Description Service Date Service Provider Modifiers Qty    [id number] HC PT THER PROC EA 15 MIN 9/24/2018 David Payton, PT GP 3          PT G-Codes  Outcome Measure Options: Samaria Cardenas Neck Disability Index (NDI)  Modified Oswestry Score/Comments: 82%  Neck Disability Index Score: 41         David Payton, PT  9/25/2018

## 2018-09-25 NOTE — THERAPY EVALUATION
"    Outpatient Physical Therapy Ortho Initial Evaluation  [facility]     Patient Name: Mariann Cade  : 1985  MRN: [id number]  Today's Date: 2018      Visit Date: 2018    There is no problem list on file for this patient.       Past Medical History:   Diagnosis Date   • Anxiety    • Chronic constipation    • Depression    • GERD (gastroesophageal reflux disease)    • Headache    • Injury of back    • Injury of neck         Past Surgical History:   Procedure Laterality Date   • BREAST SURGERY     • CHOLECYSTECTOMY     • GASTRIC SLEEVE LAPAROSCOPIC         Visit Dx:     ICD-10-CM ICD-9-CM   1. Chronic pain of left knee M25.562 719.46    G89.29 338.29   2. Rupture of anterior cruciate ligament of left knee, subsequent encounter S83.512D V58.89     844.2             Patient History     Row Name 18 1630           History    Chief Complaint Pain  -NIEVES      Type of Pain Knee pain  -NIEVES      Date Current Problem(s) Began  --      Brief Description of Current Complaint Patient was recovering from a previous fall where she injured her neck and back.  She fell around August and had difficulty straightening her knee after.  She states that she felt pain in her back that caused her to fall, she is unsure of what her knee did while she fell but she felt it \"pop\" and she landed onto her shin area.  She notes difficulty with returning to standing after this for about 1-1.5 weeks.  She still requires intermittent help with LE dressing and limited knee flexion.  MRI diagnosed ACl tear not available for review at the clinic.  -NIEVES      Current Tobacco Use  --      Hand Dominance  --      Occupation/sports/leisure activities  --      What clinical tests have you had for this problem? MRI  -NIEVES      Results of Clinical Tests ACL tear  -NIEVES      Are you or can you be pregnant  --         Pain     Pain Location Knee  -NIEVES      Pain at Present 7  -NIEVES      Pain at Best 0  -NIEVES      Pain at Worst 8  -NIEVES      What " Performance Factors Make the Current Problem(s) WORSE? turning, standing, intermittent knee instabiliy with hyperextension  -NIEVES      What Performance Factors Make the Current Problem(s) BETTER? sitting/ not walking.  -NIEVES      Difficulties at work? walking, stairs  -NIEVES      Difficulties with ADL's? LE dressing  -NIEVES      Difficulties with recreational activities? walking  -NIEVES         Fall Risk Assessment    Any falls in the past year: Yes  -NIEVES      Number of falls reported in the last 12 months 2  -NIEVES      Factors that contributed to the fall: Slippery surface;Other (comment)  -NIEVES      Does patient have a fear of falling Yes (comment)  -NIEVES         Daily Activities    Primary Language English  -NIEVES      Barriers to learning None  -NIEVES      Pt Participated in POC and Goals Yes  -NIEVES         Safety    Are you being hurt, hit, or frightened by anyone at home or in your life? No  -NIEVES        User Key  (r) = Recorded By, (t) = Taken By, (c) = Cosigned By    Initials Name Provider Type    NIEVES David Payton, PT Physical Therapist                PT Ortho     Row Name 09/24/18 1630       Posture/Observations    Posture/Observations Comments OW femaled with bilateral knee hyper extension and without significant varus/valgus.  -NIEVES       Special Tests/Palpation    Special Tests/Palpation Knee  -NIEVES       Knee Palpation    Knee Palpation? Yes  -NIEVES    Patella Tendon Left:;Tender  -NIEVES    Pes Anserine Bursa Bilateral:;Tender  -NIEVES    Medial Joint Line Left:;Tender  -NIEVES    Lateral Joint Line --   nontender  -NIEVES    Medial Gastroc Head Guarded/taut  -NIEVES    Lateral Gastroc Head Guarded/taut  -NIEVES       Patellar Accessory Motions    Patellar Accessory Motions Tested? Yes  -NIEVES    Superior glide Hypermobile  -NIEVES    Inferior glide Hypermobile  -NIEVES    Medial glide Hypermobile  -NIEVES    Lateral glide Hypermobile  -NIEVES    Lateral tilt Hypermobile  -NIEVES       Knee Special Tests    Anterior drawer (ACL lesion) Left:;Positive  -NIEVES    Lachman’s (ACL lesion)  Left:;Positive  -NIEVES    Posterior drawer (PCL lesion) Negative  -NIEVES    Valgus stress (MCL lesion) Negative  -NIEVES    Varus stress (LCL lesion) Negative  -NIEVES    Apley’s compression test (meniscal lesion vs OA) Negative  -NIEVES    Lisa’s test (meniscal lesion) Unable to Test   limited knee flexion  -NIEVES    Ann test (chondromalacia patella) Left:;Positive  -NIEVES       General ROM    RT Lower Ext Rt Knee Extension/Flexion  -NIEVES    LT Lower Ext Lt Knee Extension/Flexion  -NIEVES       Right Lower Ext    Rt Knee Extension/Flexion AROM WNL with knee hyperextension 5  -NIEVES       Left Lower Ext    Lt Knee Extension/Flexion AROM 0-5-105  -NIEVES       MMT (Manual Muscle Testing)    Rt Lower Ext Rt Hip Extension;Rt Hip ABduction;Rt Knee Extension;Rt Knee Flexion;Rt Ankle Dorsiflexion  -NIEVES    Lt Lower Ext Lt Hip ABduction;Lt Hip Extension;Lt Knee Extension;Lt Knee Flexion;Lt Ankle Dorsiflexion  -NIEVES       MMT Right Lower Ext    Rt Hip Extension MMT, Gross Movement (3+/5) fair plus  -NIEVES    Rt Hip ABduction MMT, Gross Movement (3/5) fair  -NIEVES    Rt Knee Extension MMT, Gross Movement (5/5) normal  -NIEVES    Rt Knee Flexion MMT, Gross Movement (5/5) normal  -NIEVES    Rt Ankle Dorsiflexion MMT, Gross Movement (5/5) normal  -NIEVES       MMT Left Lower Ext    Lt Hip Extension MMT, Gross Movement (3+/5) fair plus  -NIEVES    Lt Hip ABduction MMT, Gross Movement (3/5) fair  -NIEVES    Lt Knee Extension MMT, Gross Movement (5/5) normal  -NIEVES    Lt Knee Flexion MMT, Gross Movement (4/5) good  -NIEVES    Lt Ankle Dorsiflexion MMT, Gross Movement (5/5) normal  -NIEVES       Gait/Stairs Assessment/Training    Comment (Gait/Stairs) left hip vaulting, mild antalgia, slowed rebecca.  -NIEVES    Row Name 09/24/18 1600       Posture/Observations    Posture/Observations Comments OW female with generalized flat affect. Fwd head and rounded shoulders.  Hyperlordotic in standing lumbar spine  -NIEVES       Quarter Clearing    Quarter Clearing Lower Quarter Clearing;Upper Quarter Clearing  -NIEVES        DTR- Upper Quarter Clearing    Biceps (C5/6) Bilateral:;2- Normal response  -NIEVES    Brachioradialis (C6) Bilateral:;2- Normal response  -NIEVES    Triceps (C7) Bilateral:;2- Normal response  -NIEVES       Neural Tension Signs- Upper Quarter Clearing    ULNTT 1 Negative  -NIEVES    ULNTT 2 Negative  -NIEVES    ULNTT 3 Negative  -NIEVES    ULNTT 4 Negative  -NIEVES       Sensory Screen for Light Touch- Upper Quarter Clearing    C4 (posterior shoulder) Intact  -NIEVES    C5 (lateral upper arm) Intact  -NIEVES    C6 (tip of thumb) Intact  -NIEVES    C7 (tip of 3rd finger) Intact  -NIEVES    C8 (tip of 5th finger) Intact  -NIEVES    T1 (medial lower arm) Intact  -NIEVES       Myotomal Screen- Upper Quarter Clearing    Shoulder flexion (C5) Left:;4 (Good);Right:;4+ (Good +)  -NIEVES    Elbow flexion/wrist extension (C6) Bilateral:;5 (Normal)  -NIEVES    Elbow extension/wrist flexion (C7) Bilateral:;5 (Normal)  -NIEVES       Cervical/Shoulder ROM Screen    Cervical flexion Normal  -NIEVES    Cervical extension Impaired   20 pain  -NIEVES    Cervical lateral flexion Normal   pain to the right  -NIEVES    Cervical rotation Impaired   40 R 50 L pain with both  -NIEVES    Shoulder elevation  Normal  -NIEVES       DTR- Lower Quarter Clearing    Patellar tendon (L2-4) Bilateral:;2- Normal response  -NIEVES    Achilles tendon (S1-2) Bilateral:;2- Normal response  -NIEVES       Neural Tension Signs- Lower Quarter Clearing    Slump Negative  -NIEVES    SLR Negative  -NIEVES       Pathological Reflexes- Lower Quarter Clearing    Clonus Negative  -NIEVES    Rausch Negative  -NIEVES       Sensory Screen for Light Touch- Lower Quarter Clearing    L2 (anterior mid thigh) Intact  -NIEVES    L3 (distal anterior thigh) Intact  -NIEVES    L4 (medial lower leg/foot) Intact  -NIEVES    L5 (lateral lower leg/great toe) Intact  -NIEVES    S1 (bottom of foot) Intact  -NIEVES       Myotomal Screen- Lower Quarter Clearing    Hip flexion (L2) Bilateral:;4+ (Good +)  -NIEVES    Knee extension (L3) Bilateral:;5 (Normal)  -NIEVES    Ankle DF (L4) Bilateral:;5 (Normal)  -NIEVES     Great toe extension (L5) Bilateral:;4+ (Good +)  -NIEVES    Ankle PF (S1) Bilateral:;5 (Normal)  -NIEVES    Knee flexion (S2) Left:;4 (Good);Right:;5 (Normal)  -NIEVES       Lumbar ROM Screen- Lower Quarter Clearing    Lumbar Flexion Impaired   65 with pain  -NIEVES    Lumbar Extension Impaired   10 hinges at TL with pain  -NIEVES    Lumbar Lateral Flexion Normal   pain to right  -NIEVES       SI/Hip Screen- Lower Quarter Clearing    Andrew's/Harry's test Left:;Positive   vaguely painful  -NIEVES    Posterior thigh sheer Negative  -NIEVES       Special Tests/Palpation    Special Tests/Palpation Lumbar/SI;Cervical/Thoracic  -NIEVES       Cervical Palpation    Cervical Palpation- Location? Suboccipital;SCM;Cervical facets;AC joint;Scalenes;Spinous process;Levator scapula;Upper traps  -NIEVES    Suboccipital Bilateral:;Guarded/taut;Left:;Tender  -NIEVES    SCM Bilateral:;Guarded/taut;Left:;Tender  -NIEVES    AC Joint Left:;Tender;Crepitus  -NIEVES    Cervical Facets Bilateral:;Guarded/taut;Tender  -NIEVES    Scalenes Left:;Guarded/taut;Tender  -NIEVES    Spinous Process Tender  -NIEVES    Levator Scapula Left:;Guarded/taut;Tender  -NIEVES    Upper Traps Left:;Guarded/taut;Tender  -NIEVES       Thoracic Accessory Motions    Thoracic Accessory Motions Tested? Yes  -NIEVES    Pa glide- Upper thoracic Hypomobile  -NIEVES    Pa glide- Middle thoracic Hypomobile  -NIEVES    Pa glide- Lower thoracic Hypomobile  -NIEVES       Cervical/Thoracic Special Tests    Spurlings (Foraminal Compression) Left:;Positive  -NIEVES    Cervical Compression (Forarminal Compression vs. Facet Pain) Positive  -NIEVES    Cervical Distraction (Foraminal Compression vs. Facet Pain) Negative  -NIEVES       Lumbar/SI Special Tests    Sacral Spring Test (SI Dysfunction) Negative  -NIEVES    Lumbar/SI Special Tests Comments positive lumbar spring most reactivity along the TL junction but also along LS junction  -NIEVES       Lumbosacral Palpation    Lumbosacral Palpation? Yes  -NIEVES    SI Bilateral:;Tender   mild  -NIEVES    Lumbosacral Segment  Tender;Guarded/taut  -NIEVES    Spinous Process Tender   most along TL junction  -NIEVES    Piriformis Bilateral:;Guarded/taut;Tender  -NIEVES    Gluteus Ty Guarded/taut;Tender  -NIEVES    Erector Spinae (Paraspinals) Guarded/taut;Tender  -NIEVES       Heidi's Signs    Superficial and non-anatomical tenderness Positive  -NIEVES    Simulation test Negative  -NIEVES    Distraction straight leg raise test (sitting vs supine) Negative  -NIEVES    Regional disturbances Positive  -NIEVES    Overreaction to examination Negative  -NIEVES       MMT (Manual Muscle Testing)    General MMT Comments see myotomes.  Middle trap 3/5, glute max 3-/5 with pain, glute med not tested  -NIEVES       Gait/Stairs Assessment/Training    Comment (Gait/Stairs) left hip vaulting, mild antalgia, slowed rebecca.  -NIEVES      User Key  (r) = Recorded By, (t) = Taken By, (c) = Cosigned By    Initials Name Provider Type    NIEVES David Payton, PT Physical Therapist                                  PT OP Goals     Row Name 09/24/18 1630          STG Date to Achieve 10/08/18  -NIEVES    STG 1 Patient to demonstrate left knee flexion to at least 120  -NIEVES    STG 1 Progress New  -NIEVES    STG 2 Patient to perform LLE SLS at least 20 seconds  -NIEVES    STG 2 Progress New  -NIEVES    STG 3 Patient to be compliant with initial HEP for the left knee  -NIEVES    STG 3 Progress New  -NIEVES          LTG Date to Achieve 10/22/18  -NIEVES    LTG 1 Patient to be independent with final knee HEP  -NIEVES    LTG 1 Progress New  -NIEVES    LTG 2 Patient to ascend and descend one flight of stairs with one hand rail with reciprocal pattern to improve community mobility independence  -NIEVES    LTG 2 Progress New  -NIEVES    LTG 3 Patient to improve LEFS score to at least 40/80  -NIEVES    LTG 3 Progress New  -NIEVES    LTG 4 Patient to report pre treatment pain 0/10  -NIEVES    LTG 4 Progress New  -NIEVES    LTG 5  --    LTG 5 Progress  --          PT Goal Re-Cert Due Date 12/23/18  -NIEVES      User Key  (r) = Recorded By, (t) = Taken By, (c) = Cosigned By     Initials Name Provider Type    David Head, PT Physical Therapist                PT Assessment/Plan     Row Name 09/24/18 1630          Functional Limitations Performance in work activities;Performance in self-care ADL;Performance in leisure activities;Limitations in functional capacity and performance;Impaired locomotion;Impaired gait  -NIEVES    Impairments Gait;Range of motion;Posture;Poor body mechanics;Pain;Joint mobility;Joint integrity;Impaired flexibility  -NIEVES    Assessment Comments Patient presents with left ACL tear and clinical presentation to support.  She report intermittent knee instability but more anterior than posterior knee pain.  She reports significant interference in ADL's including lower body dressing, as well as impaired gait and mobility, and an inability to work secondary to her injury.  -NIEVES    Please refer to paper survey for additional self-reported information Yes  -NIEVES    Rehab Potential Good  -NIEVES    Patient/caregiver participated in establishment of treatment plan and goals Yes  -NIEVES    Patient would benefit from skilled therapy intervention Yes  -NIEVES          PT Frequency 2x/week  -NIEVES    Predicted Duration of Therapy Intervention (Therapy Eval) 8 visits  -NIEVES    Planned CPT's? PT EVAL MOD COMPLELITY: 19100;PT RE-EVAL: 14275;PT THER PROC EA 15 MIN: 71770;PT MANUAL THERAPY EA 15 MIN: 61040;PT NEUROMUSC RE-EDUCATION EA 15 MIN: 07801;PT ELECTRICAL STIM UNATTEND: ;PT HOT/COLD PACK WC NONMCARE: 25673  -NIEVES    PT Plan Comments hip and LE strengthening with CKC preference, core stabilization, balance and gait activities, manual techniques and modalities as indicated.  -NIEVES      User Key  (r) = Recorded By, (t) = Taken By, (c) = Cosigned By    Initials Name Provider Type    David Head, PT Physical Therapist                               Total Minutes            Exercise 1            Exercise 2                      Exercise 3                      Exercise 4                     User Key   (r) = Recorded By, (t) = Taken By, (c) = Cosigned By    Initials Name Provider Type    David Head, PT Physical Therapist                        Outcome Measure Options: Lower Extremity Functional Scale (LEFS)  Lower Extremity Functional Index  Any of your usual work, housework or school activities: Quite a bit of difficulty  Your usual hobbies, recreational or sporting activities: Quite a bit of difficulty  Getting into or out of the bath: Moderate difficulty  Walking between rooms: A little bit of difficulty  Putting on your shoes or socks: Quite a bit of difficulty  Squatting: Quite a bit of difficulty  Lifting an object, like a bag of groceries from the floor: A little bit of difficulty  Performing light activities around your home: Moderate difficulty  Performing heavy activities around your home: Quite a bit of difficulty  Getting into or out of a car: Moderate difficulty  Walking 2 blocks: Extreme difficulty or unable to perform activity  Walking a mile: Extreme difficulty or unable to perform activity  Going up or down 10 stairs (about 1 flight of stairs): Quite a bit of difficulty  Standing for 1 hour: Extreme difficulty or unable to perform activity  Sitting for 1 hour: No difficulty  Running on even ground: Extreme difficulty or unable to perform activity  Running on uneven ground: Extreme difficulty or unable to perform activity  Making sharp turns while running fast: Extreme difficulty or unable to perform activity  Hopping: Extreme difficulty or unable to perform activity  Rolling over in bed: Quite a bit of difficulty  Total: 23      Time Calculation:     Therapy Suggested Charges     Code   Minutes Charges    None             Start Time: 1635 patient seen as evaluation only for the knee for her personal insurance.    Therapy Charges for Today     Code Description Service Date Service Provider Modifiers Qty    [id number] HC PT EVAL MOD COMPLEXITY 4 9/24/2018 David Payton, PT  1          PT  G-Codes  Outcome Measure Options: Lower Extremity Functional Scale (LEFS)  Total: 23         David Payton, PT  9/25/2018

## 2018-10-08 ENCOUNTER — APPOINTMENT (OUTPATIENT)
Dept: PHYSICAL THERAPY | Facility: HOSPITAL | Age: 33
End: 2018-10-08

## 2018-10-12 ENCOUNTER — APPOINTMENT (OUTPATIENT)
Dept: PHYSICAL THERAPY | Facility: HOSPITAL | Age: 33
End: 2018-10-12

## 2018-11-05 ENCOUNTER — DOCUMENTATION (OUTPATIENT)
Dept: PHYSICAL THERAPY | Facility: HOSPITAL | Age: 33
End: 2018-11-05

## 2018-11-05 NOTE — THERAPY DISCHARGE NOTE
Outpatient Physical Therapy Discharge Summary         Patient Name: Mariann Cade  : 1985  MRN: [id number]    Today's Date: 2018    Visit Dx:  No diagnosis found.          PT OP Goals     Row Name 18 1000          PT Short Term Goals    STG Date to Achieve 10/08/18  -NIEVES     STG 1 Patient to demonstrate left knee flexion to at least 120  -NIEVES     STG 1 Progress Not Met  -NIEVES     STG 2 Patient to perform LLE SLS at least 20 seconds  -NIEVES     STG 2 Progress Not Met  -NIEVES     STG 3 Patient to be compliant with initial HEP for the left knee  -NIEVES     STG 3 Progress Not Met  -NIEVES        Long Term Goals    LTG Date to Achieve 10/22/18  -NIEVES     LTG 1 Patient to be independent with final knee HEP  -NIEVES     LTG 1 Progress Not Met  -NIEVES     LTG 2 Patient to ascend and descend one flight of stairs with one hand rail with reciprocal pattern to improve community mobility independence  -NIEVES     LTG 2 Progress Not Met  -NIEVES     LTG 3 Patient to improve LEFS score to at least 40/80  -NIEVES     LTG 3 Progress Not Met  -NIEVES     LTG 4 Patient to report pre treatment pain 0/10  -NIEVES     LTG 4 Progress Not Met  -NIEVES       User Key  (r) = Recorded By, (t) = Taken By, (c) = Cosigned By    Initials Name Provider Type    David Head, PT Physical Therapist          OP PT Discharge Summary  Date of Discharge: 18  Reason for Discharge: Non-compliant  Outcomes Achieved: Refer to plan of care for updates on goals achieved  Discharge Destination: Unknown  Discharge Instructions/Additional Comments: Patient presented to PT evaluation and did not return without explanation      Time Calculation:        Therapy Suggested Charges     Code   Minutes Charges    None                       David Payton, PT  2018

## 2020-01-01 NOTE — PROGRESS NOTES
"Physical Therapy Daily Progress Note    TOTAL TIME: 60 MINUTES    Mariann Cade reports: patient arrived > 20 minutes late; states back and neck are about the same- states her back hurt her \"a little bit yesterday while I was doing some housework\"      Objective   See Exercise, Manual, and Modality Logs for complete treatment.     THERAPEUTIC EXERCISES/ACTIVITIES ADDED TODAY: see flow sheets    Assessment/Plan  PATIENT NEEDS CONTINUED PHYSICAL THERAPY IN ORDER TO ACHIEVE FULL ROM, STRENGTH AND FUNCTION WITH NO REPORTS OF PAIN IN ORDER TO RTW WITHOUT RESTRICTIONS AND WITHOUT PAIN OR DYSFUNCTION       Progress per Plan of Care           Manual Therapy:         mins  99883;  Therapeutic Exercise:    45     mins  67656;     Neuromuscular Angel:        mins  40201;    Therapeutic Activity:          mins  57996;     Gait Training:           mins  76333;     Ultrasound:          mins  88378;    Electrical Stimulation:    15     mins  75559 ( );  Dry Needling          mins self-pay    Timed Treatment:   60   mins   Total Treatment:     60   mins    Troy Kiran, PT  Physical Therapist  "
Statement Selected

## 2020-01-09 ENCOUNTER — APPOINTMENT (OUTPATIENT)
Dept: GENERAL RADIOLOGY | Facility: HOSPITAL | Age: 35
End: 2020-01-09

## 2020-01-09 ENCOUNTER — HOSPITAL ENCOUNTER (EMERGENCY)
Facility: HOSPITAL | Age: 35
Discharge: HOME | End: 2020-01-09
Attending: EMERGENCY MEDICINE | Admitting: EMERGENCY MEDICINE

## 2020-01-09 ENCOUNTER — APPOINTMENT (OUTPATIENT)
Dept: CT IMAGING | Facility: HOSPITAL | Age: 35
End: 2020-01-09

## 2020-01-09 VITALS
DIASTOLIC BLOOD PRESSURE: 86 MMHG | TEMPERATURE: 98.8 F | HEIGHT: 66 IN | RESPIRATION RATE: 16 BRPM | OXYGEN SATURATION: 100 % | HEART RATE: 80 BPM | WEIGHT: 262 LBS | SYSTOLIC BLOOD PRESSURE: 135 MMHG | BODY MASS INDEX: 42.11 KG/M2

## 2020-01-09 DIAGNOSIS — R06.02 SHORTNESS OF BREATH: Primary | ICD-10-CM

## 2020-01-09 DIAGNOSIS — R05.9 COUGH: ICD-10-CM

## 2020-01-09 LAB
ALBUMIN SERPL-MCNC: 4.3 G/DL (ref 3.5–5.2)
ALBUMIN/GLOB SERPL: 1.8 G/DL
ALP SERPL-CCNC: 49 U/L (ref 39–117)
ALT SERPL W P-5'-P-CCNC: 19 U/L (ref 1–33)
ANION GAP SERPL CALCULATED.3IONS-SCNC: 13 MMOL/L (ref 5–15)
AST SERPL-CCNC: 20 U/L (ref 1–32)
B-HCG UR QL: NEGATIVE
BASOPHILS # BLD AUTO: 0.02 10*3/MM3 (ref 0–0.2)
BASOPHILS NFR BLD AUTO: 0.3 % (ref 0–1.5)
BILIRUB SERPL-MCNC: 0.3 MG/DL (ref 0.2–1.2)
BUN BLD-MCNC: 7 MG/DL (ref 6–20)
BUN/CREAT SERPL: 8 (ref 7–25)
CALCIUM SPEC-SCNC: 9.9 MG/DL (ref 8.6–10.5)
CHLORIDE SERPL-SCNC: 104 MMOL/L (ref 98–107)
CO2 SERPL-SCNC: 24 MMOL/L (ref 22–29)
CREAT BLD-MCNC: 0.87 MG/DL (ref 0.57–1)
DEPRECATED RDW RBC AUTO: 49.1 FL (ref 37–54)
EOSINOPHIL # BLD AUTO: 0.14 10*3/MM3 (ref 0–0.4)
EOSINOPHIL NFR BLD AUTO: 2.1 % (ref 0.3–6.2)
ERYTHROCYTE [DISTWIDTH] IN BLOOD BY AUTOMATED COUNT: 17.2 % (ref 12.3–15.4)
GFR SERPL CREATININE-BSD FRML MDRD: 89 ML/MIN/1.73
GLOBULIN UR ELPH-MCNC: 2.4 GM/DL
GLUCOSE BLD-MCNC: 124 MG/DL (ref 65–99)
HCT VFR BLD AUTO: 36.8 % (ref 34–46.6)
HGB BLD-MCNC: 11.1 G/DL (ref 12–15.9)
HOLD SPECIMEN: NORMAL
HOLD SPECIMEN: NORMAL
IMM GRANULOCYTES # BLD AUTO: 0.02 10*3/MM3 (ref 0–0.05)
IMM GRANULOCYTES NFR BLD AUTO: 0.3 % (ref 0–0.5)
INTERNAL NEGATIVE CONTROL: NEGATIVE
INTERNAL POSITIVE CONTROL: POSITIVE
LYMPHOCYTES # BLD AUTO: 1.41 10*3/MM3 (ref 0.7–3.1)
LYMPHOCYTES NFR BLD AUTO: 21.1 % (ref 19.6–45.3)
Lab: NORMAL
MCH RBC QN AUTO: 24.3 PG (ref 26.6–33)
MCHC RBC AUTO-ENTMCNC: 30.2 G/DL (ref 31.5–35.7)
MCV RBC AUTO: 80.7 FL (ref 79–97)
MONOCYTES # BLD AUTO: 0.45 10*3/MM3 (ref 0.1–0.9)
MONOCYTES NFR BLD AUTO: 6.7 % (ref 5–12)
NEUTROPHILS # BLD AUTO: 4.64 10*3/MM3 (ref 1.7–7)
NEUTROPHILS NFR BLD AUTO: 69.5 % (ref 42.7–76)
NRBC BLD AUTO-RTO: 0 /100 WBC (ref 0–0.2)
NT-PROBNP SERPL-MCNC: 10.3 PG/ML (ref 5–450)
PLATELET # BLD AUTO: 280 10*3/MM3 (ref 140–450)
PMV BLD AUTO: 10.2 FL (ref 6–12)
POTASSIUM BLD-SCNC: 3.1 MMOL/L (ref 3.5–5.2)
PROT SERPL-MCNC: 6.7 G/DL (ref 6–8.5)
RBC # BLD AUTO: 4.56 10*6/MM3 (ref 3.77–5.28)
SODIUM BLD-SCNC: 141 MMOL/L (ref 136–145)
TROPONIN T SERPL-MCNC: <0.01 NG/ML (ref 0–0.03)
WBC NRBC COR # BLD: 6.68 10*3/MM3 (ref 3.4–10.8)
WHOLE BLOOD HOLD SPECIMEN: NORMAL
WHOLE BLOOD HOLD SPECIMEN: NORMAL

## 2020-01-09 PROCEDURE — 96374 THER/PROPH/DIAG INJ IV PUSH: CPT

## 2020-01-09 PROCEDURE — 71275 CT ANGIOGRAPHY CHEST: CPT

## 2020-01-09 PROCEDURE — 83880 ASSAY OF NATRIURETIC PEPTIDE: CPT | Performed by: EMERGENCY MEDICINE

## 2020-01-09 PROCEDURE — 25010000002 METHYLPREDNISOLONE PER 125 MG: Performed by: NURSE PRACTITIONER

## 2020-01-09 PROCEDURE — 81025 URINE PREGNANCY TEST: CPT | Performed by: EMERGENCY MEDICINE

## 2020-01-09 PROCEDURE — 93005 ELECTROCARDIOGRAM TRACING: CPT | Performed by: EMERGENCY MEDICINE

## 2020-01-09 PROCEDURE — 0 IOPAMIDOL PER 1 ML: Performed by: EMERGENCY MEDICINE

## 2020-01-09 PROCEDURE — 99284 EMERGENCY DEPT VISIT MOD MDM: CPT

## 2020-01-09 PROCEDURE — 84484 ASSAY OF TROPONIN QUANT: CPT | Performed by: EMERGENCY MEDICINE

## 2020-01-09 PROCEDURE — 71045 X-RAY EXAM CHEST 1 VIEW: CPT

## 2020-01-09 PROCEDURE — 85025 COMPLETE CBC W/AUTO DIFF WBC: CPT | Performed by: EMERGENCY MEDICINE

## 2020-01-09 PROCEDURE — 80053 COMPREHEN METABOLIC PANEL: CPT | Performed by: EMERGENCY MEDICINE

## 2020-01-09 PROCEDURE — 94640 AIRWAY INHALATION TREATMENT: CPT

## 2020-01-09 PROCEDURE — 93005 ELECTROCARDIOGRAM TRACING: CPT

## 2020-01-09 RX ORDER — METHYLPREDNISOLONE SODIUM SUCCINATE 125 MG/2ML
125 INJECTION, POWDER, LYOPHILIZED, FOR SOLUTION INTRAMUSCULAR; INTRAVENOUS ONCE
Status: COMPLETED | OUTPATIENT
Start: 2020-01-09 | End: 2020-01-09

## 2020-01-09 RX ORDER — ALBUTEROL SULFATE 90 UG/1
2 AEROSOL, METERED RESPIRATORY (INHALATION) EVERY 6 HOURS PRN
Qty: 8 G | Refills: 0 | Status: SHIPPED | OUTPATIENT
Start: 2020-01-09

## 2020-01-09 RX ORDER — POTASSIUM CHLORIDE 750 MG/1
40 CAPSULE, EXTENDED RELEASE ORAL ONCE
Status: COMPLETED | OUTPATIENT
Start: 2020-01-09 | End: 2020-01-09

## 2020-01-09 RX ORDER — TRIAMCINOLONE ACETONIDE 55 UG/1
2 SPRAY, METERED NASAL DAILY
Qty: 16.5 G | Refills: 0 | Status: SHIPPED | OUTPATIENT
Start: 2020-01-09 | End: 2020-02-08

## 2020-01-09 RX ORDER — IPRATROPIUM BROMIDE AND ALBUTEROL SULFATE 2.5; .5 MG/3ML; MG/3ML
3 SOLUTION RESPIRATORY (INHALATION) ONCE
Status: COMPLETED | OUTPATIENT
Start: 2020-01-09 | End: 2020-01-09

## 2020-01-09 RX ORDER — PREDNISONE 50 MG/1
50 TABLET ORAL DAILY
Qty: 5 TABLET | Refills: 0 | Status: SHIPPED | OUTPATIENT
Start: 2020-01-09

## 2020-01-09 RX ORDER — BENZONATATE 100 MG/1
100 CAPSULE ORAL 3 TIMES DAILY PRN
Qty: 21 CAPSULE | Refills: 0 | Status: SHIPPED | OUTPATIENT
Start: 2020-01-09

## 2020-01-09 RX ORDER — SODIUM CHLORIDE 0.9 % (FLUSH) 0.9 %
10 SYRINGE (ML) INJECTION AS NEEDED
Status: DISCONTINUED | OUTPATIENT
Start: 2020-01-09 | End: 2020-01-09 | Stop reason: HOSPADM

## 2020-01-09 RX ADMIN — METHYLPREDNISOLONE SODIUM SUCCINATE 125 MG: 125 INJECTION, POWDER, FOR SOLUTION INTRAMUSCULAR; INTRAVENOUS at 03:34

## 2020-01-09 RX ADMIN — POTASSIUM CHLORIDE 40 MEQ: 750 CAPSULE, EXTENDED RELEASE ORAL at 03:34

## 2020-01-09 RX ADMIN — IOPAMIDOL 72.4 ML: 755 INJECTION, SOLUTION INTRAVENOUS at 01:50

## 2020-01-09 RX ADMIN — IPRATROPIUM BROMIDE AND ALBUTEROL SULFATE 3 ML: 2.5; .5 SOLUTION RESPIRATORY (INHALATION) at 03:34

## 2020-01-09 NOTE — ED PROVIDER NOTES
Subjective   Mariann Cade is a 34 yr old female emergency department for complaints of shortness of breath, cough.  Patient advises she has had a cough off and on since September.  Patient reports intermittent shortness of breath as well as chest pain.  Patient has multiple providers and been on steroids and antibiotics as well as cough medications with no relief.  Denies any fevers, chills.  She denies any nausea, vomiting.  Patient advises she has traveled multiple short distances but denies any history of blood clots.  Patient did have birth control implanted in September.      History provided by:  Patient  Shortness of Breath   Severity:  Moderate  Timing:  Intermittent  Relieved by:  Nothing  Worsened by:  Deep breathing, coughing, movement and activity  Associated symptoms: chest pain and cough    Associated symptoms: no abdominal pain, no fever, no hemoptysis, no sputum production and no vomiting    Risk factors: prolonged immobilization    Risk factors: no hx of PE/DVT and no recent surgery        Review of Systems   Constitutional: Negative for chills and fever.   Respiratory: Positive for cough and shortness of breath. Negative for hemoptysis and sputum production.    Cardiovascular: Positive for chest pain.   Gastrointestinal: Negative for abdominal pain, nausea and vomiting.   Neurological: Negative for dizziness, weakness and light-headedness.   All other systems reviewed and are negative.      Past Medical History:   Diagnosis Date   • Anxiety    • Chronic constipation    • Depression    • GERD (gastroesophageal reflux disease)    • Headache    • Injury of back    • Injury of neck        No Known Allergies    Past Surgical History:   Procedure Laterality Date   • BREAST SURGERY     • CHOLECYSTECTOMY     • GASTRIC SLEEVE LAPAROSCOPIC         History reviewed. No pertinent family history.    Social History     Socioeconomic History   • Marital status:      Spouse name: Not on file   • Number of  children: Not on file   • Years of education: Not on file   • Highest education level: Not on file   Tobacco Use   • Smoking status: Unknown If Ever Smoked   Substance and Sexual Activity   • Alcohol use: Yes     Alcohol/week: 3.0 standard drinks     Types: 3 Glasses of wine per week   • Drug use: No           Objective   Physical Exam   Constitutional: She is oriented to person, place, and time. She appears well-developed and well-nourished. She is cooperative.  Non-toxic appearance. She does not appear ill.   HENT:   Head: Normocephalic and atraumatic.   Mouth/Throat: Oropharynx is clear and moist.   Eyes: Pupils are equal, round, and reactive to light. Conjunctivae, EOM and lids are normal.   Neck: Trachea normal, normal range of motion and full passive range of motion without pain.   Cardiovascular: Regular rhythm, normal heart sounds, intact distal pulses and normal pulses. Tachycardia present.   Pulmonary/Chest: Effort normal. No respiratory distress. She has no decreased breath sounds. She has wheezes. She has no rhonchi. She has no rales.   Abdominal: Soft. Normal appearance and bowel sounds are normal. There is no tenderness.   Musculoskeletal: Normal range of motion.   Neurological: She is alert and oriented to person, place, and time. She has normal strength. No cranial nerve deficit.   Skin: Skin is warm, dry and intact. No rash noted.   Psychiatric: She has a normal mood and affect. Her speech is normal and behavior is normal.   Nursing note and vitals reviewed.      Procedures           ED Course  ED Course as of Jan 09 0428   Thu Jan 09, 2020   0319 CT Angiogram Chest [KG]   0320 Pt will be given meds at this time.      [KG]   0344 Potassium(!): 3.1 [KG]   0422 Pt is advised of her results at this time.  Patient will be discharged home.  Patient to take meds as ordered.  Patient to follow-up with allergist, pulmonologist.  Patient has an appointment to see her PCP tomorrow to set up any additional  testing.  Patient encouraged to keep her appointment.    [KG]      ED Course User Index  [KG] Miladys Pricedelphine FULLER, TUYET           Recent Results (from the past 24 hour(s))   Comprehensive Metabolic Panel    Collection Time: 01/09/20  1:12 AM   Result Value Ref Range    Glucose 124 (H) 65 - 99 mg/dL    BUN 7 6 - 20 mg/dL    Creatinine 0.87 0.57 - 1.00 mg/dL    Sodium 141 136 - 145 mmol/L    Potassium 3.1 (L) 3.5 - 5.2 mmol/L    Chloride 104 98 - 107 mmol/L    CO2 24.0 22.0 - 29.0 mmol/L    Calcium 9.9 8.6 - 10.5 mg/dL    Total Protein 6.7 6.0 - 8.5 g/dL    Albumin 4.30 3.50 - 5.20 g/dL    ALT (SGPT) 19 1 - 33 U/L    AST (SGOT) 20 1 - 32 U/L    Alkaline Phosphatase 49 39 - 117 U/L    Total Bilirubin 0.3 0.2 - 1.2 mg/dL    eGFR  African Amer 89 >60 mL/min/1.73    Globulin 2.4 gm/dL    A/G Ratio 1.8 g/dL    BUN/Creatinine Ratio 8.0 7.0 - 25.0    Anion Gap 13.0 5.0 - 15.0 mmol/L   BNP    Collection Time: 01/09/20  1:12 AM   Result Value Ref Range    proBNP 10.3 5.0 - 450.0 pg/mL   Troponin    Collection Time: 01/09/20  1:12 AM   Result Value Ref Range    Troponin T <0.010 0.000 - 0.030 ng/mL   Light Blue Top    Collection Time: 01/09/20  1:12 AM   Result Value Ref Range    Extra Tube hold for add-on    Green Top (Gel)    Collection Time: 01/09/20  1:12 AM   Result Value Ref Range    Extra Tube Hold for add-ons.    Lavender Top    Collection Time: 01/09/20  1:12 AM   Result Value Ref Range    Extra Tube hold for add-on    Gold Top - SST    Collection Time: 01/09/20  1:12 AM   Result Value Ref Range    Extra Tube Hold for add-ons.    CBC Auto Differential    Collection Time: 01/09/20  1:12 AM   Result Value Ref Range    WBC 6.68 3.40 - 10.80 10*3/mm3    RBC 4.56 3.77 - 5.28 10*6/mm3    Hemoglobin 11.1 (L) 12.0 - 15.9 g/dL    Hematocrit 36.8 34.0 - 46.6 %    MCV 80.7 79.0 - 97.0 fL    MCH 24.3 (L) 26.6 - 33.0 pg    MCHC 30.2 (L) 31.5 - 35.7 g/dL    RDW 17.2 (H) 12.3 - 15.4 %    RDW-SD 49.1 37.0 - 54.0 fl    MPV 10.2 6.0 -  12.0 fL    Platelets 280 140 - 450 10*3/mm3    Neutrophil % 69.5 42.7 - 76.0 %    Lymphocyte % 21.1 19.6 - 45.3 %    Monocyte % 6.7 5.0 - 12.0 %    Eosinophil % 2.1 0.3 - 6.2 %    Basophil % 0.3 0.0 - 1.5 %    Immature Grans % 0.3 0.0 - 0.5 %    Neutrophils, Absolute 4.64 1.70 - 7.00 10*3/mm3    Lymphocytes, Absolute 1.41 0.70 - 3.10 10*3/mm3    Monocytes, Absolute 0.45 0.10 - 0.90 10*3/mm3    Eosinophils, Absolute 0.14 0.00 - 0.40 10*3/mm3    Basophils, Absolute 0.02 0.00 - 0.20 10*3/mm3    Immature Grans, Absolute 0.02 0.00 - 0.05 10*3/mm3    nRBC 0.0 0.0 - 0.2 /100 WBC   POCT pregnancy, urine    Collection Time: 01/09/20  3:19 AM   Result Value Ref Range    HCG, Urine, QL Negative Negative    Lot Number [id number]     Internal Positive Control Positive     Internal Negative Control Negative      Note: In addition to lab results from this visit, the labs listed above may include labs taken at another facility or during a different encounter within the last 24 hours. Please correlate lab times with ED admission and discharge times for further clarification of the services performed during this visit.    CT Angiogram Chest   Final Result   1. Negative for pulmonary embolus.      2. No acute findings in the chest.      Signer Name: Pavel Oneill MD    Signed: 1/9/2020 2:00 AM    [facility]     Radiology [facility]      XR Chest 1 View   Final Result   No acute cardiopulmonary findings.      Signer Name: Pavel Oneill MD    Signed: 1/9/2020 1:25 AM    Workstation [facility]     Radiology [facility]        Vitals:    01/09/20 0024 01/09/20 0030 01/09/20 0136 01/09/20 0334   BP: 139/88 135/86     Pulse:  95 80    Resp:    16   Temp:       TempSrc:       SpO2:  100% 100% 100%   Weight:       Height:         Medications   sodium chloride 0.9 % flush 10 mL (has no administration in time range)   iopamidol (ISOVUE-370) 76 % injection 100 mL (72.4 mL Intravenous Given  1/9/20 0150)   methylPREDNISolone sodium succinate (SOLU-Medrol) injection 125 mg (125 mg Intravenous Given 1/9/20 0334)   ipratropium-albuterol (DUO-NEB) nebulizer solution 3 mL (3 mL Nebulization Given 1/9/20 0334)   potassium chloride (MICRO-K) CR capsule 40 mEq (40 mEq Oral Given 1/9/20 0334)     ECG/EMG Results (last 24 hours)     Procedure Component Value Units Date/Time    ECG 12 Lead [[id number]] Collected:  01/09/20 0014     Updated:  01/09/20 0014        ECG 12 Lead                                                 MDM    Final diagnoses:   Shortness of breath   Cough            Lolis Price, APRN  01/09/20 [id number]

## 2020-06-28 ENCOUNTER — HOSPITAL ENCOUNTER (EMERGENCY)
Facility: HOSPITAL | Age: 35
Discharge: HOME | End: 2020-06-29
Attending: EMERGENCY MEDICINE | Admitting: EMERGENCY MEDICINE

## 2020-06-28 ENCOUNTER — APPOINTMENT (OUTPATIENT)
Dept: GENERAL RADIOLOGY | Facility: HOSPITAL | Age: 35
End: 2020-06-28

## 2020-06-28 DIAGNOSIS — R07.9 CHEST PAIN, UNSPECIFIED TYPE: Primary | ICD-10-CM

## 2020-06-28 LAB
ALBUMIN SERPL-MCNC: 4.3 G/DL (ref 3.5–5.2)
ALBUMIN/GLOB SERPL: 1.5 G/DL
ALP SERPL-CCNC: 50 U/L (ref 39–117)
ALT SERPL W P-5'-P-CCNC: 20 U/L (ref 1–33)
ANION GAP SERPL CALCULATED.3IONS-SCNC: 13 MMOL/L (ref 5–15)
AST SERPL-CCNC: 30 U/L (ref 1–32)
B-HCG UR QL: NEGATIVE
BACTERIA UR QL AUTO: ABNORMAL /HPF
BASOPHILS # BLD AUTO: 0.02 10*3/MM3 (ref 0–0.2)
BASOPHILS NFR BLD AUTO: 0.3 % (ref 0–1.5)
BILIRUB SERPL-MCNC: 0.2 MG/DL (ref 0.2–1.2)
BILIRUB UR QL STRIP: NEGATIVE
BUN BLD-MCNC: 12 MG/DL (ref 6–20)
BUN/CREAT SERPL: 12.4 (ref 7–25)
CALCIUM SPEC-SCNC: 9.4 MG/DL (ref 8.6–10.5)
CHLORIDE SERPL-SCNC: 104 MMOL/L (ref 98–107)
CLARITY UR: CLEAR
CO2 SERPL-SCNC: 23 MMOL/L (ref 22–29)
COLOR UR: YELLOW
CREAT BLD-MCNC: 0.97 MG/DL (ref 0.57–1)
D DIMER PPP FEU-MCNC: 0.4 MCGFEU/ML (ref 0–0.56)
DEPRECATED RDW RBC AUTO: 45.5 FL (ref 37–54)
EOSINOPHIL # BLD AUTO: 0.19 10*3/MM3 (ref 0–0.4)
EOSINOPHIL NFR BLD AUTO: 2.8 % (ref 0.3–6.2)
ERYTHROCYTE [DISTWIDTH] IN BLOOD BY AUTOMATED COUNT: 15 % (ref 12.3–15.4)
GFR SERPL CREATININE-BSD FRML MDRD: 78 ML/MIN/1.73
GLOBULIN UR ELPH-MCNC: 2.8 GM/DL
GLUCOSE BLD-MCNC: 142 MG/DL (ref 65–99)
GLUCOSE UR STRIP-MCNC: NEGATIVE MG/DL
HCT VFR BLD AUTO: 36.8 % (ref 34–46.6)
HGB BLD-MCNC: 11.6 G/DL (ref 12–15.9)
HGB UR QL STRIP.AUTO: ABNORMAL
HOLD SPECIMEN: NORMAL
HOLD SPECIMEN: NORMAL
HYALINE CASTS UR QL AUTO: ABNORMAL /LPF
IMM GRANULOCYTES # BLD AUTO: 0.03 10*3/MM3 (ref 0–0.05)
IMM GRANULOCYTES NFR BLD AUTO: 0.4 % (ref 0–0.5)
INTERNAL NEGATIVE CONTROL: NEGATIVE
INTERNAL POSITIVE CONTROL: POSITIVE
KETONES UR QL STRIP: ABNORMAL
LEUKOCYTE ESTERASE UR QL STRIP.AUTO: NEGATIVE
LIPASE SERPL-CCNC: 54 U/L (ref 13–60)
LYMPHOCYTES # BLD AUTO: 1.56 10*3/MM3 (ref 0.7–3.1)
LYMPHOCYTES NFR BLD AUTO: 22.8 % (ref 19.6–45.3)
Lab: NORMAL
MCH RBC QN AUTO: 26 PG (ref 26.6–33)
MCHC RBC AUTO-ENTMCNC: 31.5 G/DL (ref 31.5–35.7)
MCV RBC AUTO: 82.3 FL (ref 79–97)
MONOCYTES # BLD AUTO: 0.43 10*3/MM3 (ref 0.1–0.9)
MONOCYTES NFR BLD AUTO: 6.3 % (ref 5–12)
NEUTROPHILS # BLD AUTO: 4.61 10*3/MM3 (ref 1.7–7)
NEUTROPHILS NFR BLD AUTO: 67.4 % (ref 42.7–76)
NITRITE UR QL STRIP: NEGATIVE
NRBC BLD AUTO-RTO: 0 /100 WBC (ref 0–0.2)
NT-PROBNP SERPL-MCNC: 5.7 PG/ML (ref 5–450)
PH UR STRIP.AUTO: 6.5 [PH] (ref 5–8)
PLAT MORPH BLD: NORMAL
PLATELET # BLD AUTO: 155 10*3/MM3 (ref 140–450)
PMV BLD AUTO: 10.6 FL (ref 6–12)
POTASSIUM BLD-SCNC: 4 MMOL/L (ref 3.5–5.2)
PROT SERPL-MCNC: 7.1 G/DL (ref 6–8.5)
PROT UR QL STRIP: NEGATIVE
RBC # BLD AUTO: 4.47 10*6/MM3 (ref 3.77–5.28)
RBC # UR: ABNORMAL /HPF
RBC MORPH BLD: NORMAL
REF LAB TEST METHOD: ABNORMAL
SODIUM BLD-SCNC: 140 MMOL/L (ref 136–145)
SP GR UR STRIP: 1.02 (ref 1–1.03)
SQUAMOUS #/AREA URNS HPF: ABNORMAL /HPF
TROPONIN T SERPL-MCNC: <0.01 NG/ML (ref 0–0.03)
UROBILINOGEN UR QL STRIP: ABNORMAL
WBC MORPH BLD: NORMAL
WBC NRBC COR # BLD: 6.84 10*3/MM3 (ref 3.4–10.8)
WBC UR QL AUTO: ABNORMAL /HPF
WHOLE BLOOD HOLD SPECIMEN: NORMAL
WHOLE BLOOD HOLD SPECIMEN: NORMAL

## 2020-06-28 PROCEDURE — 93005 ELECTROCARDIOGRAM TRACING: CPT

## 2020-06-28 PROCEDURE — 83880 ASSAY OF NATRIURETIC PEPTIDE: CPT

## 2020-06-28 PROCEDURE — 99284 EMERGENCY DEPT VISIT MOD MDM: CPT

## 2020-06-28 PROCEDURE — 71045 X-RAY EXAM CHEST 1 VIEW: CPT

## 2020-06-28 PROCEDURE — 93005 ELECTROCARDIOGRAM TRACING: CPT | Performed by: EMERGENCY MEDICINE

## 2020-06-28 PROCEDURE — 81025 URINE PREGNANCY TEST: CPT | Performed by: EMERGENCY MEDICINE

## 2020-06-28 PROCEDURE — 84484 ASSAY OF TROPONIN QUANT: CPT

## 2020-06-28 PROCEDURE — 83690 ASSAY OF LIPASE: CPT

## 2020-06-28 PROCEDURE — 80053 COMPREHEN METABOLIC PANEL: CPT

## 2020-06-28 PROCEDURE — 85025 COMPLETE CBC W/AUTO DIFF WBC: CPT

## 2020-06-28 PROCEDURE — 85379 FIBRIN DEGRADATION QUANT: CPT | Performed by: EMERGENCY MEDICINE

## 2020-06-28 PROCEDURE — 81001 URINALYSIS AUTO W/SCOPE: CPT | Performed by: EMERGENCY MEDICINE

## 2020-06-28 PROCEDURE — 96374 THER/PROPH/DIAG INJ IV PUSH: CPT

## 2020-06-28 PROCEDURE — 85007 BL SMEAR W/DIFF WBC COUNT: CPT

## 2020-06-28 RX ORDER — ASPIRIN 81 MG/1
324 TABLET, CHEWABLE ORAL ONCE
Status: DISCONTINUED | OUTPATIENT
Start: 2020-06-28 | End: 2020-06-29 | Stop reason: HOSPADM

## 2020-06-28 RX ORDER — SODIUM CHLORIDE 0.9 % (FLUSH) 0.9 %
10 SYRINGE (ML) INJECTION AS NEEDED
Status: DISCONTINUED | OUTPATIENT
Start: 2020-06-28 | End: 2020-06-29 | Stop reason: HOSPADM

## 2020-06-29 VITALS
SYSTOLIC BLOOD PRESSURE: 138 MMHG | RESPIRATION RATE: 18 BRPM | HEIGHT: 67 IN | DIASTOLIC BLOOD PRESSURE: 78 MMHG | BODY MASS INDEX: 26.68 KG/M2 | OXYGEN SATURATION: 99 % | TEMPERATURE: 98.8 F | WEIGHT: 170 LBS | HEART RATE: 78 BPM

## 2020-06-29 PROCEDURE — 25010000002 KETOROLAC TROMETHAMINE PER 15 MG: Performed by: EMERGENCY MEDICINE

## 2020-06-29 PROCEDURE — 96374 THER/PROPH/DIAG INJ IV PUSH: CPT

## 2020-06-29 RX ORDER — NAPROXEN 500 MG/1
500 TABLET ORAL 2 TIMES DAILY WITH MEALS
Qty: 20 TABLET | Refills: 0 | Status: SHIPPED | OUTPATIENT
Start: 2020-06-29

## 2020-06-29 RX ORDER — KETOROLAC TROMETHAMINE 30 MG/ML
30 INJECTION, SOLUTION INTRAMUSCULAR; INTRAVENOUS ONCE
Status: COMPLETED | OUTPATIENT
Start: 2020-06-29 | End: 2020-06-29

## 2020-06-29 RX ORDER — ONDANSETRON 4 MG/1
4 TABLET, ORALLY DISINTEGRATING ORAL 4 TIMES DAILY PRN
Qty: 15 TABLET | Refills: 0 | Status: SHIPPED | OUTPATIENT
Start: 2020-06-29

## 2020-06-29 RX ADMIN — KETOROLAC TROMETHAMINE 30 MG: 30 INJECTION, SOLUTION INTRAMUSCULAR at 00:53

## 2023-11-13 ENCOUNTER — HOSPITAL ENCOUNTER (EMERGENCY)
Facility: HOSPITAL | Age: 38
Discharge: HOME | End: 2023-11-13
Attending: EMERGENCY MEDICINE | Admitting: EMERGENCY MEDICINE
Payer: COMMERCIAL

## 2023-11-13 ENCOUNTER — APPOINTMENT (OUTPATIENT)
Dept: CT IMAGING | Facility: HOSPITAL | Age: 38
End: 2023-11-13
Payer: COMMERCIAL

## 2023-11-13 VITALS
BODY MASS INDEX: 31.23 KG/M2 | WEIGHT: 199 LBS | DIASTOLIC BLOOD PRESSURE: 107 MMHG | TEMPERATURE: 98.5 F | SYSTOLIC BLOOD PRESSURE: 158 MMHG | HEIGHT: 67 IN | HEART RATE: 77 BPM | OXYGEN SATURATION: 100 % | RESPIRATION RATE: 14 BRPM

## 2023-11-13 DIAGNOSIS — N39.0 ACUTE UTI (URINARY TRACT INFECTION): ICD-10-CM

## 2023-11-13 DIAGNOSIS — R10.13 EPIGASTRIC ABDOMINAL PAIN: Primary | ICD-10-CM

## 2023-11-13 LAB
ALBUMIN SERPL-MCNC: 3.8 G/DL (ref 3.5–5.2)
ALBUMIN/GLOB SERPL: 1.2 G/DL
ALP SERPL-CCNC: 93 U/L (ref 39–117)
ALT SERPL W P-5'-P-CCNC: 8 U/L (ref 1–33)
ANION GAP SERPL CALCULATED.3IONS-SCNC: 7 MMOL/L (ref 5–15)
AST SERPL-CCNC: 21 U/L (ref 1–32)
BACTERIA UR QL AUTO: ABNORMAL /HPF
BASOPHILS # BLD AUTO: 0.02 10*3/MM3 (ref 0–0.2)
BASOPHILS NFR BLD AUTO: 0.2 % (ref 0–1.5)
BILIRUB SERPL-MCNC: 0.3 MG/DL (ref 0–1.2)
BILIRUB UR QL STRIP: NEGATIVE
BUN SERPL-MCNC: 4 MG/DL (ref 6–20)
BUN/CREAT SERPL: 4.8 (ref 7–25)
CALCIUM SPEC-SCNC: 9 MG/DL (ref 8.6–10.5)
CHLORIDE SERPL-SCNC: 104 MMOL/L (ref 98–107)
CLARITY UR: ABNORMAL
CO2 SERPL-SCNC: 27 MMOL/L (ref 22–29)
COLOR UR: YELLOW
CREAT SERPL-MCNC: 0.84 MG/DL (ref 0.57–1)
D-LACTATE SERPL-SCNC: 1.6 MMOL/L (ref 0.5–2)
DEPRECATED RDW RBC AUTO: 41.9 FL (ref 37–54)
EGFRCR SERPLBLD CKD-EPI 2021: 90.2 ML/MIN/1.73
EOSINOPHIL # BLD AUTO: 0.06 10*3/MM3 (ref 0–0.4)
EOSINOPHIL NFR BLD AUTO: 0.6 % (ref 0.3–6.2)
ERYTHROCYTE [DISTWIDTH] IN BLOOD BY AUTOMATED COUNT: 12.8 % (ref 12.3–15.4)
GLOBULIN UR ELPH-MCNC: 3.1 GM/DL
GLUCOSE SERPL-MCNC: 123 MG/DL (ref 65–99)
GLUCOSE UR STRIP-MCNC: NEGATIVE MG/DL
HCT VFR BLD AUTO: 39.5 % (ref 34–46.6)
HGB BLD-MCNC: 12.3 G/DL (ref 12–15.9)
HGB UR QL STRIP.AUTO: ABNORMAL
HOLD SPECIMEN: NORMAL
HOLD SPECIMEN: NORMAL
HYALINE CASTS UR QL AUTO: ABNORMAL /LPF
IMM GRANULOCYTES # BLD AUTO: 0.02 10*3/MM3 (ref 0–0.05)
IMM GRANULOCYTES NFR BLD AUTO: 0.2 % (ref 0–0.5)
KETONES UR QL STRIP: NEGATIVE
LEUKOCYTE ESTERASE UR QL STRIP.AUTO: ABNORMAL
LIPASE SERPL-CCNC: 21 U/L (ref 13–60)
LYMPHOCYTES # BLD AUTO: 0.85 10*3/MM3 (ref 0.7–3.1)
LYMPHOCYTES NFR BLD AUTO: 8.6 % (ref 19.6–45.3)
MCH RBC QN AUTO: 27.8 PG (ref 26.6–33)
MCHC RBC AUTO-ENTMCNC: 31.1 G/DL (ref 31.5–35.7)
MCV RBC AUTO: 89.4 FL (ref 79–97)
MONOCYTES # BLD AUTO: 0.46 10*3/MM3 (ref 0.1–0.9)
MONOCYTES NFR BLD AUTO: 4.7 % (ref 5–12)
NEUTROPHILS NFR BLD AUTO: 8.43 10*3/MM3 (ref 1.7–7)
NEUTROPHILS NFR BLD AUTO: 85.7 % (ref 42.7–76)
NITRITE UR QL STRIP: NEGATIVE
NRBC BLD AUTO-RTO: 0 /100 WBC (ref 0–0.2)
PH UR STRIP.AUTO: 7.5 [PH] (ref 5–8)
PLATELET # BLD AUTO: 269 10*3/MM3 (ref 140–450)
PMV BLD AUTO: 10.3 FL (ref 6–12)
POTASSIUM SERPL-SCNC: 3.7 MMOL/L (ref 3.5–5.2)
PROT SERPL-MCNC: 6.9 G/DL (ref 6–8.5)
PROT UR QL STRIP: ABNORMAL
RBC # BLD AUTO: 4.42 10*6/MM3 (ref 3.77–5.28)
RBC # UR STRIP: ABNORMAL /HPF
REF LAB TEST METHOD: ABNORMAL
SODIUM SERPL-SCNC: 138 MMOL/L (ref 136–145)
SP GR UR STRIP: 1.02 (ref 1–1.03)
SQUAMOUS #/AREA URNS HPF: ABNORMAL /HPF
TROPONIN T SERPL HS-MCNC: <6 NG/L
UROBILINOGEN UR QL STRIP: ABNORMAL
WBC # UR STRIP: ABNORMAL /HPF
WBC NRBC COR # BLD: 9.84 10*3/MM3 (ref 3.4–10.8)
WHOLE BLOOD HOLD COAG: NORMAL
WHOLE BLOOD HOLD SPECIMEN: NORMAL

## 2023-11-13 PROCEDURE — 81001 URINALYSIS AUTO W/SCOPE: CPT | Performed by: EMERGENCY MEDICINE

## 2023-11-13 PROCEDURE — 83690 ASSAY OF LIPASE: CPT | Performed by: EMERGENCY MEDICINE

## 2023-11-13 PROCEDURE — 93005 ELECTROCARDIOGRAM TRACING: CPT | Performed by: EMERGENCY MEDICINE

## 2023-11-13 PROCEDURE — 99285 EMERGENCY DEPT VISIT HI MDM: CPT

## 2023-11-13 PROCEDURE — 80053 COMPREHEN METABOLIC PANEL: CPT | Performed by: EMERGENCY MEDICINE

## 2023-11-13 PROCEDURE — 83605 ASSAY OF LACTIC ACID: CPT | Performed by: PHYSICIAN ASSISTANT

## 2023-11-13 PROCEDURE — 25010000002 CEFTRIAXONE PER 250 MG: Performed by: PHYSICIAN ASSISTANT

## 2023-11-13 PROCEDURE — 85025 COMPLETE CBC W/AUTO DIFF WBC: CPT | Performed by: EMERGENCY MEDICINE

## 2023-11-13 PROCEDURE — 25510000001 IOPAMIDOL PER 1 ML: Performed by: EMERGENCY MEDICINE

## 2023-11-13 PROCEDURE — 93005 ELECTROCARDIOGRAM TRACING: CPT

## 2023-11-13 PROCEDURE — 74177 CT ABD & PELVIS W/CONTRAST: CPT

## 2023-11-13 PROCEDURE — 84484 ASSAY OF TROPONIN QUANT: CPT | Performed by: EMERGENCY MEDICINE

## 2023-11-13 PROCEDURE — 36415 COLL VENOUS BLD VENIPUNCTURE: CPT

## 2023-11-13 PROCEDURE — 96365 THER/PROPH/DIAG IV INF INIT: CPT

## 2023-11-13 RX ORDER — CEFDINIR 300 MG/1
300 CAPSULE ORAL 2 TIMES DAILY
Qty: 20 CAPSULE | Refills: 0 | Status: SHIPPED | OUTPATIENT
Start: 2023-11-13 | End: 2023-11-23

## 2023-11-13 RX ORDER — SODIUM CHLORIDE 0.9 % (FLUSH) 0.9 %
10 SYRINGE (ML) INJECTION AS NEEDED
Status: DISCONTINUED | OUTPATIENT
Start: 2023-11-13 | End: 2023-11-14 | Stop reason: HOSPADM

## 2023-11-13 RX ORDER — SUCRALFATE 1 G/1
1 TABLET ORAL 4 TIMES DAILY PRN
Qty: 40 TABLET | Refills: 0 | Status: SHIPPED | OUTPATIENT
Start: 2023-11-13 | End: 2023-11-23

## 2023-11-13 RX ADMIN — IOPAMIDOL 100 ML: 755 INJECTION, SOLUTION INTRAVENOUS at 21:31

## 2023-11-13 RX ADMIN — CEFTRIAXONE SODIUM 2000 MG: 2 INJECTION, POWDER, FOR SOLUTION INTRAMUSCULAR; INTRAVENOUS at 21:41

## 2023-11-14 LAB
HOLD SPECIMEN: NORMAL
QT INTERVAL: 394 MS
QTC INTERVAL: 431 MS

## 2023-11-14 NOTE — DISCHARGE INSTRUCTIONS
Symptomatic care is recommended. Take all medications as prescribed and instructed.  Taking complete full course of antibiotics as prescribed.  Follow up with primary care as directed or return to Emergency Department with worsening of symptoms.

## 2023-11-14 NOTE — ED PROVIDER NOTES
EMERGENCY DEPARTMENT ENCOUNTER    Pt Name: Mariann Cade  MRN: [id number]  Pt :   1985  Room Number:  10/10  Date of encounter:  2023  PCP: Shona Huber APRN  ED Provider: ADILENE Freeman    Historian: Patient    HPI:  Chief Complaint: Abdominal pain    Context: Mariann Cade is a 38 y.o. female who presents to the ED c/o epigastric abdominal pain.  Patient has an extensive abdominal history with reported ruptures of her bowel secondary to ulcers.  She states that she still is having stomach pains even after being in the hospital for 2 months.  Patient follows with Dr. Hackett for her gastric bypass surgery.  She states that she has been in palliative medication to treat her pain but is wanting to not take pain medication for the rest of her life and has not been given a diagnosis what is causing the pain.  She reports that the issue just seems that is not being addressed.  She states last week she started to experience epigastric pain that is worse when taking a deep breath.  Patient reports in addition to her pain she has experienced nausea and vomiting for approximately the last 2 weeks.  She reports that she felt as if she was constipated so she initiated MiraLAX and Linzess.  She states that this did help to produce bowel movements but she does not feel like they have been significant enough.  She also reports burning with urination but states that she had stents placed in her kidneys as result of her inpatient hospitalization and this could be related to recently removing the stents.  She has had no appetite but has been trying to keep up with fluid intake.  She denies any fever.  She has no additional complaints on interview and exam.  HPI     REVIEW OF SYSTEMS  A chief complaint appropriate review of systems was completed and is negative except as noted in the HPI.     PAST MEDICAL HISTORY  Past Medical History:   Diagnosis Date    Anxiety     Chronic constipation     Depression      GERD (gastroesophageal reflux disease)     Headache     Injury of back     Injury of neck        PAST SURGICAL HISTORY  Past Surgical History:   Procedure Laterality Date    BREAST SURGERY      CHOLECYSTECTOMY      GASTRIC SLEEVE LAPAROSCOPIC         FAMILY HISTORY  History reviewed. No pertinent family history.    SOCIAL HISTORY  Social History     Socioeconomic History    Marital status:    Tobacco Use    Smoking status: Unknown   Substance and Sexual Activity    Alcohol use: Yes     Alcohol/week: 3.0 standard drinks of alcohol     Types: 3 Glasses of wine per week    Drug use: No       ALLERGIES  Morphine    PHYSICAL EXAM  Physical Exam  Vitals and nursing note reviewed.   Constitutional:       General: She is not in acute distress.     Appearance: Normal appearance. She is well-developed. She is not ill-appearing or toxic-appearing.   HENT:      Head: Normocephalic and atraumatic.      Nose: Nose normal.      Mouth/Throat:      Mouth: Mucous membranes are moist.   Eyes:      Extraocular Movements: Extraocular movements intact.   Cardiovascular:      Rate and Rhythm: Normal rate.   Pulmonary:      Effort: Pulmonary effort is normal.      Breath sounds: Normal breath sounds.   Abdominal:      General: Bowel sounds are normal. There is no distension.      Palpations: Abdomen is soft.      Tenderness: There is generalized abdominal tenderness.      Comments: No evidence of acute abdomen on physical exam. No rebound or guarding. No peritoneal signs.     Musculoskeletal:         General: Normal range of motion.      Cervical back: Normal range of motion and neck supple.   Skin:     General: Skin is warm and dry.   Neurological:      General: No focal deficit present.      Mental Status: She is alert.   Psychiatric:         Mood and Affect: Mood normal.         Behavior: Behavior normal.       LAB RESULTS  Results for orders placed or performed during the hospital encounter of 11/13/23   Comprehensive  Metabolic Panel    Specimen: Blood   Result Value Ref Range    Glucose 123 (H) 65 - 99 mg/dL    BUN 4 (L) 6 - 20 mg/dL    Creatinine 0.84 0.57 - 1.00 mg/dL    Sodium 138 136 - 145 mmol/L    Potassium 3.7 3.5 - 5.2 mmol/L    Chloride 104 98 - 107 mmol/L    CO2 27.0 22.0 - 29.0 mmol/L    Calcium 9.0 8.6 - 10.5 mg/dL    Total Protein 6.9 6.0 - 8.5 g/dL    Albumin 3.8 3.5 - 5.2 g/dL    ALT (SGPT) 8 1 - 33 U/L    AST (SGOT) 21 1 - 32 U/L    Alkaline Phosphatase 93 39 - 117 U/L    Total Bilirubin 0.3 0.0 - 1.2 mg/dL    Globulin 3.1 gm/dL    A/G Ratio 1.2 g/dL    BUN/Creatinine Ratio 4.8 (L) 7.0 - 25.0    Anion Gap 7.0 5.0 - 15.0 mmol/L    eGFR 90.2 >60.0 mL/min/1.73   Lipase    Specimen: Blood   Result Value Ref Range    Lipase 21 13 - 60 U/L   Single High Sensitivity Troponin T    Specimen: Blood   Result Value Ref Range    HS Troponin T <6 <14 ng/L   Urinalysis With Microscopic If Indicated (No Culture) - Urine, Clean Catch    Specimen: Urine, Clean Catch   Result Value Ref Range    Color, UA Yellow Yellow, Straw    Appearance, UA Cloudy (A) Clear    pH, UA 7.5 5.0 - 8.0    Specific Gravity, UA 1.016 1.001 - 1.030    Glucose, UA Negative Negative    Ketones, UA Negative Negative    Bilirubin, UA Negative Negative    Blood, UA Large (3+) (A) Negative    Protein, UA 30 mg/dL (1+) (A) Negative    Leuk Esterase, UA Large (3+) (A) Negative    Nitrite, UA Negative Negative    Urobilinogen, UA 1.0 E.U./dL 0.2 - 1.0 E.U./dL   CBC Auto Differential    Specimen: Blood   Result Value Ref Range    WBC 9.84 3.40 - 10.80 10*3/mm3    RBC 4.42 3.77 - 5.28 10*6/mm3    Hemoglobin 12.3 12.0 - 15.9 g/dL    Hematocrit 39.5 34.0 - 46.6 %    MCV 89.4 79.0 - 97.0 fL    MCH 27.8 26.6 - 33.0 pg    MCHC 31.1 (L) 31.5 - 35.7 g/dL    RDW 12.8 12.3 - 15.4 %    RDW-SD 41.9 37.0 - 54.0 fl    MPV 10.3 6.0 - 12.0 fL    Platelets 269 140 - 450 10*3/mm3    Neutrophil % 85.7 (H) 42.7 - 76.0 %    Lymphocyte % 8.6 (L) 19.6 - 45.3 %    Monocyte % 4.7 (L) 5.0  - 12.0 %    Eosinophil % 0.6 0.3 - 6.2 %    Basophil % 0.2 0.0 - 1.5 %    Immature Grans % 0.2 0.0 - 0.5 %    Neutrophils, Absolute 8.43 (H) 1.70 - 7.00 10*3/mm3    Lymphocytes, Absolute 0.85 0.70 - 3.10 10*3/mm3    Monocytes, Absolute 0.46 0.10 - 0.90 10*3/mm3    Eosinophils, Absolute 0.06 0.00 - 0.40 10*3/mm3    Basophils, Absolute 0.02 0.00 - 0.20 10*3/mm3    Immature Grans, Absolute 0.02 0.00 - 0.05 10*3/mm3    nRBC 0.0 0.0 - 0.2 /100 WBC   Lactic Acid, Plasma    Specimen: Blood   Result Value Ref Range    Lactate 1.6 0.5 - 2.0 mmol/L   Urinalysis, Microscopic Only - Urine, Clean Catch    Specimen: Urine, Clean Catch   Result Value Ref Range    RBC, UA 3-5 (A) None Seen, 0-2 /HPF    WBC, UA Too Numerous to Count (A) None Seen, 0-2 /HPF    Bacteria, UA 4+ (A) None Seen, Trace /HPF    Squamous Epithelial Cells, UA 0-2 None Seen, 0-2 /HPF    Hyaline Casts, UA 7-12 0 - 6 /LPF    Methodology Automated Microscopy    ECG 12 Lead ED Triage Standing Order; Abdominal Pain (Upper)   Result Value Ref Range    QT Interval 394 ms    QTC Interval 431 ms   Green Top (Gel)   Result Value Ref Range    Extra Tube Hold for add-ons.    Lavender Top   Result Value Ref Range    Extra Tube hold for add-on    Gold Top - SST   Result Value Ref Range    Extra Tube Hold for add-ons.    Gray Top   Result Value Ref Range    Extra Tube Hold for add-ons.    Light Blue Top   Result Value Ref Range    Extra Tube Hold for add-ons.        If labs were ordered, I independently reviewed the results and considered them in treating the patient.    RADIOLOGY  CT Abdomen Pelvis With Contrast   Final Result   Impression:   1.There is evidence of previous partial gastric resection, possibly gastric bypass surgery. There is wall thickening and extensive fat stranding involving the distal stomach and pylorus. This is likely related to gastritis/duodenitis. No extraluminal air    or loculated fluid to indicate abscess or perforation. There are adjacent  mesenteric lymph nodes which are mildly prominent and are likely inflammatory/reactive.   2.There are slightly wedge-shaped hypodensities in the mid and lower portion of the right kidney which may be related to pyelonephritis/infection. The bladder appears mildly diffusely thick-walled but is not completely distended. Correlation with    urinalysis is needed.   3.Numerous chronic ancillary findings are detailed above            Electronically Signed: Ramirezdelphine Odonnell DO     11/13/2023 9:53 PM EST     Workstation ID: [id number]        [x] Radiologist's Report Reviewed:  I ordered and independently reviewed the above noted radiographic studies.  See radiologist's dictation for official interpretation.      PROCEDURES    Procedures    ECG 12 Lead ED Triage Standing Order; Abdominal Pain (Upper)   Final Result   Test Reason : ED Triage Standing Order~   Blood Pressure :   */*   mmHG   Vent. Rate :  72 BPM     Atrial Rate :  72 BPM      P-R Int : 178 ms          QRS Dur : 100 ms       QT Int : 394 ms       P-R-T Axes :  53 -10   7 degrees      QTc Int : 431 ms      Normal sinus rhythm with sinus arrhythmia   Nonspecific T wave abnormality   Abnormal ECG   When compared with ECG of 28-JUN-2020 21:08,   No significant change was found   Confirmed by NATALIIA RICHARDSON ([id number]) on 11/14/2023 11:30:31 AM      Referred By:            Confirmed By: NATALIIA RICHARDSON          MEDICATIONS GIVEN IN ER    Medications   cefTRIAXone (ROCEPHIN) 2,000 mg in sodium chloride 0.9 % 100 mL IVPB (0 mg Intravenous Stopped 11/13/23 2216)   iopamidol (ISOVUE-370) 76 % injection 100 mL (100 mL Intravenous Given 11/13/23 2131)       MEDICAL DECISION MAKING, PROGRESS, and CONSULTS   Medical Decision Making  Problems Addressed:  Acute UTI (urinary tract infection): complicated acute illness or injury  Epigastric abdominal pain: complicated acute illness or injury    Amount and/or Complexity of Data Reviewed  Labs: ordered.  Radiology: ordered.  ECG/medicine  tests: ordered.    Risk  Prescription drug management.        All labs have been independently reviewed by me.  All radiology studies have been reviewed by me and the radiologist dictating the report.  All EKG's have been independently viewed by me.    [] Discussed with radiology regarding test interpretation:    Discussion below represents my analysis of pertinent findings related to patient's condition, differential diagnosis, treatment plan and final disposition.    Differential diagnosis:  The differential diagnosis associated with the patient's presentation includes: Dyspepsia, viral gastroenteritis, constipation, medication side effects, irritable bowel syndrome, abdominal wall pain, gallbladder disease, pancreatitis, diverticulitis, appendicitis, kidney stone, UTI, hernia, gastroparesis, bowel obstruction, endometriosis, adenomyosis, ovarian cyst, colitis.     Additional sources  Discussed/ obtained information from independent historians:   [] Spouse  [] Parent  [x] Family member  [] Friend  [] EMS   [] Other:  External (non-ED) record review:   [] Inpatient record:   [x] Office record: Personally reviewed office visit with vascular surgery at [facility] from November and 6, 2023   [] Outpatient record:   [] Prior Outpatient labs:   [] Prior Outpatient radiology:   [] Primary Care record:   [] Outside ED record:   [] Other:   Patient's care impacted by:   [] Diabetes  [] Hypertension  [] Hyperlipidemia  [] Hypothyroidism   [] Coronary Artery Disease   [] COPD   [] Cancer   [x] Obesity  [x] GERD   [] Tobacco Abuse   [] Substance Abuse    [] Anxiety   [] Depression   [x] Other: Chronic gastric ulcer  Care significantly affected by Social Determinants of Health (housing and economic circumstances, unemployment)    [] Yes     [x] No   If yes, Patient's care significantly limited by  Social Determinants of Health including:   [] Inadequate housing   [] Low income   [] Alcoholism and drug addiction in  family   [] Problems related to primary support group   [] Unemployment   [] Problems related to employment   [] Other Social Determinants of Health:     Shared decision making:  I had a discussion with the patient/family regarding diagnosis, diagnostic results, treatment plan, and medications.  The patient/family indicated understanding of these instructions.  I spent adequate time at the bedside preceding discharge necessary to personally discuss the aftercare instructions, giving patient education, providing explanations of the results of our evaluations/findings, and my decision making to assure that the patient/family understand the plan of care.  Time was allotted to answer questions at that time and throughout the ED course.  Emphasis was placed on timely follow-up after discharge.  I also discussed the potential for the development of an acute emergent condition requiring further evaluation, admission, or even surgical intervention. I discussed that we found nothing during the visit today indicating the need for further workup, admission, or the presence of an unstable medical condition.  I encouraged the patient to return to the emergency department immediately for ANY concerns, worsening, new complaints, or if symptoms persist and unable to seek follow-up in a timely fashion.  The patient/family expressed understanding and agreement with this plan.  The patient will follow-up with primary care and GI for reevaluation.      Orders placed during this visit:  Orders Placed This Encounter   Procedures    CT Abdomen Pelvis With Contrast    [facility] Draw    Comprehensive Metabolic Panel    Lipase    Single High Sensitivity Troponin T    Urinalysis With Microscopic If Indicated (No Culture) - Urine, Clean Catch    CBC Auto Differential    Lactic Acid, Plasma    Urinalysis, Microscopic Only - Urine, Clean Catch    ECG 12 Lead ED Triage Standing Order; Abdominal Pain (Upper)    CBC & Differential    Green Top (Gel)     Lavender Top    Gold Top - SST    Gray Top    Light Blue Top       I considered prescription management  with:   [] Pain medication  [] Antiviral  [x] Antibiotic: Implemented as prescribed for treatment of urinary tract infection   [] Other:   Rationale:    ED Course:    ED Course as of 11/15/23 1042 Mon Nov 13, 2023 2017 Personally reviewed findings of urinalysis demonstrating urinary tract infection. [JG]   2146 Patient updated on labs and findings of urinary tract infection.  Resting comfortably in no acute distress. [JG]   2234 Personally reviewed findings of CT with Chad Chao who was in agreement with treatment of UTI outpatient. No concerns of abdominal findings based on CT.  [JG]   2248 In summary this is a 38-year-old female with chronic abdominal complaints who presents to the ER with complaints of epigastric abdominal pain.  Also with complaints of dysuria.  Findings on urinalysis consistent with urinary tract infection.  Patient treated with initial dose of Rocephin while in the ED and prescribed antibiotics to continue outpatient treatment of her UTI.  Remainder of labs without acute or emergent abnormalities.  CT of the abdomen and pelvis confirms findings consistent with gastritis as well as a urinary tract infection. At time of discharge disposition patient is afebrile, nontoxic appearing, vital signs stable and able to maintain O2 sats of 100% on room air. [JG]      ED Course User Index  [JG] Arsalan King PA            DIAGNOSIS  Final diagnoses:   Epigastric abdominal pain   Acute UTI (urinary tract infection)       DISPOSITION    ED Disposition       ED Disposition   Discharge    Condition   Stable    Comment   --               Please note that portions of this document were completed with voice recognition software.        Arsalan King PA  11/15/23 1042

## 2024-07-11 ENCOUNTER — HOSPITAL ENCOUNTER (EMERGENCY)
Facility: HOSPITAL | Age: 39
Discharge: HOME | End: 2024-07-12
Attending: EMERGENCY MEDICINE
Payer: MEDICAID

## 2024-07-11 DIAGNOSIS — S92.424A CLOSED NONDISPLACED FRACTURE OF DISTAL PHALANX OF RIGHT GREAT TOE, INITIAL ENCOUNTER: Primary | ICD-10-CM

## 2024-07-11 PROCEDURE — 99283 EMERGENCY DEPT VISIT LOW MDM: CPT

## 2024-07-11 RX ORDER — HYDROMORPHONE HYDROCHLORIDE 4 MG/1
4 TABLET ORAL EVERY 4 HOURS PRN
COMMUNITY

## 2024-07-11 RX ORDER — GABAPENTIN 300 MG/1
600 CAPSULE ORAL 3 TIMES DAILY
COMMUNITY

## 2024-07-11 RX ORDER — ERGOCALCIFEROL 1.25 MG/1
50000 CAPSULE ORAL
COMMUNITY

## 2024-07-11 RX ORDER — OMEPRAZOLE/SODIUM BICARBONATE 40; 1680 MG/1; MG/1
1 POWDER, FOR SUSPENSION ORAL
COMMUNITY

## 2024-07-11 NOTE — Clinical Note
[facility] EMERGENCY DEPARTMENT [place]  [address]  [place] [address]  Phone: [phone]  Fax: [phone]    Mariann Cade was seen and treated in our emergency department on 7/11/2024.  She may return to work on 07/14/2024.         Thank you for choosing [facility].    Jorgito Guzman MD

## 2024-07-12 ENCOUNTER — APPOINTMENT (OUTPATIENT)
Facility: HOSPITAL | Age: 39
End: 2024-07-12
Payer: MEDICAID

## 2024-07-12 VITALS
HEART RATE: 64 BPM | WEIGHT: 210 LBS | TEMPERATURE: 98.2 F | BODY MASS INDEX: 31.83 KG/M2 | SYSTOLIC BLOOD PRESSURE: 136 MMHG | HEIGHT: 68 IN | DIASTOLIC BLOOD PRESSURE: 84 MMHG | OXYGEN SATURATION: 100 % | RESPIRATION RATE: 18 BRPM

## 2024-07-12 PROCEDURE — 73660 X-RAY EXAM OF TOE(S): CPT

## 2024-07-12 RX ORDER — ACETAMINOPHEN 325 MG/1
650 TABLET ORAL EVERY 6 HOURS PRN
Status: DISCONTINUED | OUTPATIENT
Start: 2024-07-12 | End: 2024-07-12 | Stop reason: HOSPADM

## 2024-07-12 RX ADMIN — ACETAMINOPHEN 650 MG: 325 TABLET ORAL at 00:14

## 2024-07-12 NOTE — FSED PROVIDER NOTE
"Subjective  History of Present Illness:    Patient presents the emergency department after a toe injury.  Patient reports that her close laptop fell directly on her right great toe at the interphalangeal joint.  She reports that this occurred approximately 24 hours ago.  Patient reports pain and swelling      Nurses Notes reviewed and agree, including vitals, allergies, social history and prior medical history.     REVIEW OF SYSTEMS: All systems reviewed and not pertinent unless noted.  Review of Systems   Musculoskeletal:  Positive for joint swelling.       Past Medical History:   Diagnosis Date    Anxiety     Chronic constipation     Depression     GERD (gastroesophageal reflux disease)     Headache     Injury of back     Injury of neck        Allergies:    Morphine      Past Surgical History:   Procedure Laterality Date    BREAST SURGERY      CHOLECYSTECTOMY      GASTRIC SLEEVE LAPAROSCOPIC           Social History     Socioeconomic History    Marital status:    Tobacco Use    Smoking status: Unknown   Substance and Sexual Activity    Alcohol use: Yes     Alcohol/week: 3.0 standard drinks of alcohol     Types: 3 Glasses of wine per week    Drug use: No         History reviewed. No pertinent family history.    Objective  Physical Exam:  /84   Pulse 64   Temp 98.2 °F (36.8 °C) (Oral)   Resp 18   Ht 172.7 cm (68\")   Wt 95.3 kg (210 lb)   LMP 07/08/2024 (Exact Date)   SpO2 100%   BMI 31.93 kg/m²      Physical Exam  Vitals and nursing note reviewed.   Constitutional:       Appearance: Normal appearance.   HENT:      Right Ear: External ear normal.      Left Ear: External ear normal.      Nose: Nose normal.      Mouth/Throat:      Mouth: Mucous membranes are moist.   Eyes:      Extraocular Movements: Extraocular movements intact.   Cardiovascular:      Rate and Rhythm: Normal rate and regular rhythm.   Pulmonary:      Effort: Pulmonary effort is normal.      Breath sounds: Normal breath sounds. "   Musculoskeletal:         General: Swelling present. Normal range of motion.   Skin:     General: Skin is warm and dry.   Neurological:      General: No focal deficit present.      Mental Status: She is alert.   Psychiatric:         Mood and Affect: Mood normal.         Behavior: Behavior normal.         Thought Content: Thought content normal.         Procedures    ED Course:         Lab Results (last 24 hours)       ** No results found for the last 24 hours. **             XR Toe 2+ View Right    Result Date: 7/12/2024  XR TOE 2+ VW RIGHT Date of Exam: 7/12/2024 12:05 AM EDT Indication: right foot Comparison: None available. Findings: There is an acute nondisplaced fracture at the medial plantar base of the first digit distal phalanx with extension into the IP joint. No additional fracture. There are small osteophytes at the interphalangeal joints with relative preservation of joint space.     Impression: Impression: Nondisplaced fracture at the base of the first digit distal phalanx with intra-articular extension. Electronically Signed: Frederick Madrid MD  7/12/2024 12:22 AM EDT  Workstation ID: [id number]        [facility]     Amount and/or Complexity of Data Reviewed  Tests in the radiology section of CPT®: reviewed        Initial impression of presenting illness: Toe contusion    DDX: includes but is not limited to: Toe fracture, toe sprain, toe dislocation    Patient arrives ambulatory with vitals interpreted by myself.     Pertinent features from physical exam: Tenderness.    Initial diagnostic plan: X-ray    Results from initial plan were reviewed and interpreted by me revealing nondisplaced toe fracture    Diagnostic information from other sources:     Interventions / Re-evaluation:     Medications   acetaminophen (TYLENOL) tablet 650 mg (650 mg Oral Given 7/12/24 0014)       Results/clinical rationale were discussed with patient    Consultations/Discussion of results with other physicians:     Data interpreted:  Nursing notes reviewed, vital signs reviewed.    Plain extremity XR's reviewed independently interpreted by me.  EKG independently interpreted by me.  O2 saturation:    Counseling: Discussed the results above with the patient regarding need for discharged home. Return precautions were given to patient.  Patient understands and agrees plan of care.      -----  ED Disposition       ED Disposition   Discharge    Condition   Stable    Comment   --             Final diagnoses:   Closed nondisplaced fracture of distal phalanx of right great toe, initial encounter      Your Follow-Up Providers       Shona Huber APRN In 1 week.    Specialty: Family Medicine  [address] [place], [place] [id number] [phone]                       Contact information for after-discharge care    Follow-up information has not been specified.                    Your medication list        CONTINUE taking these medications        Instructions Last Dose Given Next Dose Due   gabapentin 300 MG capsule  Commonly known as: NEURONTIN      Take 2 capsules by mouth 3 (Three) Times a Day.       HYDROmorphone 4 MG tablet  Commonly known as: DILAUDID      Take 1 tablet by mouth Every 4 (Four) Hours As Needed for Moderate Pain.       omeprazole-sodium bicarbonate  MG pack packet  Commonly known as: ZEGERID      Take 1 packet by mouth Every Morning Before Breakfast.       Ubrelvy 50 MG tablet  Generic drug: ubrogepant      Take 1 tablet by mouth 1 (One) Time As Needed (migraine).       vitamin D 1.25 MG (58294 UT) capsule capsule  Commonly known as: ERGOCALCIFEROL      Take 1 capsule by mouth Every 7 (Seven) Days.

## 2025-02-20 DIAGNOSIS — Z98.890 S/P LEFT ROTATOR CUFF REPAIR: Primary | ICD-10-CM

## 2025-02-20 RX ORDER — ONDANSETRON 4 MG/1
4 TABLET, FILM COATED ORAL EVERY 8 HOURS PRN
Qty: 10 TABLET | Refills: 1 | Status: SHIPPED | OUTPATIENT
Start: 2025-02-20

## 2025-02-20 RX ORDER — OXYCODONE AND ACETAMINOPHEN 5; 325 MG/1; MG/1
1 TABLET ORAL EVERY 6 HOURS PRN
Qty: 30 TABLET | Refills: 0 | Status: SHIPPED | OUTPATIENT
Start: 2025-02-20

## 2025-02-21 ENCOUNTER — TELEPHONE (OUTPATIENT)
Dept: ORTHOPEDIC SURGERY | Facility: CLINIC | Age: 40
End: 2025-02-21

## 2025-02-21 NOTE — TELEPHONE ENCOUNTER
Patient  called stating that the patients medication oxyCODONE is not stronger enough to maintain the pain and that the pain blocker has started to wore off and she is needing a stronger pain medication     Please advise, thank you

## 2025-02-21 NOTE — TELEPHONE ENCOUNTER
After ending call with patient, I noticed that the patient had been scheduled for an appointment on 2/24/25 for left shoulder pain- the same shoulder she had surgery on. I cancelled appointment and called her to direct her that [facility] to get [facility] appointment.

## 2025-02-21 NOTE — TELEPHONE ENCOUNTER
Called patient and let her know that she would need to contact [facility] since she is a patient of Dr Hinson through them and not [facility]. I offered her the number, but she stated that she already had it and her  was going to call them.     Hannah

## 2025-05-14 ENCOUNTER — APPOINTMENT (OUTPATIENT)
Facility: HOSPITAL | Age: 40
End: 2025-05-14
Payer: COMMERCIAL

## 2025-05-14 ENCOUNTER — HOSPITAL ENCOUNTER (EMERGENCY)
Facility: HOSPITAL | Age: 40
Discharge: HOME | End: 2025-05-14
Attending: EMERGENCY MEDICINE | Admitting: EMERGENCY MEDICINE
Payer: COMMERCIAL

## 2025-05-14 VITALS
WEIGHT: 229 LBS | HEART RATE: 101 BPM | HEIGHT: 69 IN | SYSTOLIC BLOOD PRESSURE: 121 MMHG | OXYGEN SATURATION: 98 % | BODY MASS INDEX: 33.92 KG/M2 | TEMPERATURE: 98.2 F | DIASTOLIC BLOOD PRESSURE: 72 MMHG | RESPIRATION RATE: 16 BRPM

## 2025-05-14 DIAGNOSIS — M25.562 ACUTE PAIN OF LEFT KNEE: Primary | ICD-10-CM

## 2025-05-14 DIAGNOSIS — S86.912A MUSCLE STRAIN OF LEFT KNEE, INITIAL ENCOUNTER: ICD-10-CM

## 2025-05-14 PROCEDURE — 73560 X-RAY EXAM OF KNEE 1 OR 2: CPT

## 2025-05-14 PROCEDURE — 99283 EMERGENCY DEPT VISIT LOW MDM: CPT | Performed by: EMERGENCY MEDICINE

## 2025-05-14 RX ORDER — ACETAMINOPHEN 500 MG
1000 TABLET ORAL ONCE
Status: COMPLETED | OUTPATIENT
Start: 2025-05-14 | End: 2025-05-14

## 2025-05-14 RX ADMIN — ACETAMINOPHEN 1000 MG: 500 TABLET, FILM COATED ORAL at 16:57

## 2025-05-14 NOTE — [facility] PROVIDER NOTE
"Subjective   History of Present Illness  Patient is a 40-year-old female that presents with 5 days of left knee pain.  Patient recently had a microsurgery procedure done on her abdomen and back, while traveling back from the procedure walking off the airplane she hit her left knee on the wall.  Patient states that pain with walking is worse than sitting.  Patient has been wearing a knee brace at home to help stabilize leg as she has to get up and walk every hour.  Patient had an ACL repair on this knee unknown number of years ago.  Patient denies pain in the calf, numbness, tingling, swelling of that leg.  Patient wearing compression stockings as surgeon requested her to.  Patient not taking blood thinners, nor aspirin.  Patient has been taking cephalexin, has 4 days left.  Patient denies nausea, vomiting, fever, difficulty breathing.  Patient says when she bends her left leg \"I feel a popping.    History provided by:  Patient      Review of Systems   Constitutional: Negative.    HENT: Negative.     Eyes: Negative.    Respiratory: Negative.     Cardiovascular: Negative.    Gastrointestinal: Negative.    Endocrine: Negative.    Genitourinary: Negative.    Musculoskeletal:  Positive for joint swelling.   Skin: Negative.    Allergic/Immunologic: Negative.    Neurological: Negative.    Hematological: Negative.    Psychiatric/Behavioral: Negative.         Past Medical History:   Diagnosis Date    Anxiety     Chronic constipation     Depression     GERD (gastroesophageal reflux disease)     Headache     Injury of back     Injury of neck        Allergies   Allergen Reactions    Morphine Itching and Nausea Only       Past Surgical History:   Procedure Laterality Date    BREAST SURGERY      CHOLECYSTECTOMY      GASTRIC SLEEVE LAPAROSCOPIC         No family history on file.    Social History     Socioeconomic History    Marital status:    Tobacco Use    Smoking status: Unknown   Substance and Sexual Activity    Alcohol " use: Yes     Alcohol/week: 3.0 standard drinks of alcohol     Types: 3 Glasses of wine per week    Drug use: No           Objective   Physical Exam  Constitutional:       Appearance: Normal appearance.   HENT:      Head: Normocephalic and atraumatic.      Mouth/Throat:      Mouth: Mucous membranes are moist.   Eyes:      Pupils: Pupils are equal, round, and reactive to light.   Cardiovascular:      Rate and Rhythm: Normal rate and regular rhythm.   Pulmonary:      Effort: Pulmonary effort is normal.      Breath sounds: Normal breath sounds.   Abdominal:      General: Abdomen is flat.   Musculoskeletal:         General: Tenderness present.      Cervical back: Normal range of motion.      Comments: Patient with active and passive range of motion of the left knee.  Patient ambulation on right leg predominantly.  Patient's left knee mild effusion   Skin:     General: Skin is warm and dry.   Neurological:      General: No focal deficit present.      Mental Status: She is alert.   Psychiatric:         Mood and Affect: Mood normal.         Behavior: Behavior normal.         Thought Content: Thought content normal.         Judgment: Judgment normal.         Procedures           ED Course  ED Course as of 05/15/25 1305   Wed May 14, 2025   1501 XR Knee 1 or 2 View Left [MP]      ED Course User Index  [MP] Belgica Paz PA-C                                           Medical Decision Making  Differential diagnosis: Fracture, sprain, strain  Patient is a 40-year-old female presenting with 5 days of left knee pain.  Patient says she has a history of injuring this knee, had ACL repair unknown number of years ago.  Patient says she walked into a table/wall and noted a popping feeling in her left knee.  Patient has been wearing a knee brace that she has at home to help with ambulation as she is having to walk every hour from lipo procedure she had done last week.  Patient actively taking Keflex.  Plain films were  nonactionable, showed moderate effusion.  I spoke with Dr. Saldivar fragmentation and plain film results, he recommended orthopedics outpatient follow-up for possible MRI to recheck today symptoms.  I discussed the plan going forward with the patient who voiced understanding with no further questions.  I told patient that if numbness, tingling, worsening pain presented to return to the emergency department.    Problems Addressed:  Acute pain of left knee: complicated acute illness or injury  Muscle strain of left knee, initial encounter: complicated acute illness or injury    Amount and/or Complexity of Data Reviewed  Radiology: ordered. Decision-making details documented in ED Course.    Risk  OTC drugs.        Final diagnoses:   Acute pain of left knee   Muscle strain of left knee, initial encounter       ED Disposition  ED Disposition       ED Disposition   Discharge    Condition   Stable    Comment   --               Flora Antonio, APRN  [address]  [address] [facility] [id number] [phone]    Schedule an appointment as soon as possible for a visit       Ronny Elmore Jr., MD  [address]  [address]  [facility] [id number] [phone]               Medication List      No changes were made to your prescriptions during this visit.